# Patient Record
Sex: FEMALE | Race: WHITE | NOT HISPANIC OR LATINO | Employment: FULL TIME | URBAN - NONMETROPOLITAN AREA
[De-identification: names, ages, dates, MRNs, and addresses within clinical notes are randomized per-mention and may not be internally consistent; named-entity substitution may affect disease eponyms.]

---

## 2017-11-22 ENCOUNTER — LAB (OUTPATIENT)
Dept: LAB | Facility: HOSPITAL | Age: 42
End: 2017-11-22

## 2017-11-22 ENCOUNTER — TRANSCRIBE ORDERS (OUTPATIENT)
Dept: LAB | Facility: HOSPITAL | Age: 42
End: 2017-11-22

## 2017-11-22 DIAGNOSIS — R05.9 COUGH: ICD-10-CM

## 2017-11-22 DIAGNOSIS — R06.00 DYSPNEA, UNSPECIFIED TYPE: ICD-10-CM

## 2017-11-22 DIAGNOSIS — J30.1 ACUTE SEASONAL ALLERGIC RHINITIS DUE TO POLLEN: ICD-10-CM

## 2017-11-22 DIAGNOSIS — J30.89 PERENNIAL ALLERGIC RHINITIS: ICD-10-CM

## 2017-11-22 DIAGNOSIS — J45.40 MODERATE PERSISTENT ASTHMA, UNCOMPLICATED: Primary | ICD-10-CM

## 2017-11-22 DIAGNOSIS — J45.40 MODERATE PERSISTENT ASTHMA, UNCOMPLICATED: ICD-10-CM

## 2017-11-22 LAB
BASOPHILS # BLD AUTO: 0.05 10*3/MM3 (ref 0–0.3)
BASOPHILS NFR BLD AUTO: 0.6 % (ref 0–2)
DEPRECATED RDW RBC AUTO: 41.8 FL (ref 37–54)
EOSINOPHIL # BLD AUTO: 0.08 10*3/MM3 (ref 0–0.7)
EOSINOPHIL NFR BLD AUTO: 1 % (ref 0–5)
ERYTHROCYTE [DISTWIDTH] IN BLOOD BY AUTOMATED COUNT: 12.7 % (ref 11.5–14.5)
HCT VFR BLD AUTO: 42.4 % (ref 37–47)
HGB BLD-MCNC: 14.1 G/DL (ref 12–16)
IMM GRANULOCYTES # BLD: 0.02 10*3/MM3 (ref 0–0.03)
IMM GRANULOCYTES NFR BLD: 0.2 % (ref 0–0.5)
LYMPHOCYTES # BLD AUTO: 2.23 10*3/MM3 (ref 1–3)
LYMPHOCYTES NFR BLD AUTO: 27.5 % (ref 21–51)
MCH RBC QN AUTO: 30.7 PG (ref 27–33)
MCHC RBC AUTO-ENTMCNC: 33.3 G/DL (ref 33–37)
MCV RBC AUTO: 92.4 FL (ref 80–94)
MONOCYTES # BLD AUTO: 0.81 10*3/MM3 (ref 0.1–0.9)
MONOCYTES NFR BLD AUTO: 10 % (ref 0–10)
NEUTROPHILS # BLD AUTO: 4.91 10*3/MM3 (ref 1.4–6.5)
NEUTROPHILS NFR BLD AUTO: 60.7 % (ref 30–70)
PLATELET # BLD AUTO: 211 10*3/MM3 (ref 130–400)
PMV BLD AUTO: 11.9 FL (ref 6–10)
RBC # BLD AUTO: 4.59 10*6/MM3 (ref 4.2–5.4)
WBC NRBC COR # BLD: 8.1 10*3/MM3 (ref 4.5–12.5)

## 2017-11-22 PROCEDURE — 36415 COLL VENOUS BLD VENIPUNCTURE: CPT

## 2017-11-22 PROCEDURE — 85025 COMPLETE CBC W/AUTO DIFF WBC: CPT | Performed by: ALLERGY & IMMUNOLOGY

## 2017-11-22 PROCEDURE — 82785 ASSAY OF IGE: CPT | Performed by: ALLERGY & IMMUNOLOGY

## 2017-11-24 LAB — TOTAL IGE SMQN RAST: 7 IU/ML (ref 0–100)

## 2018-06-12 ENCOUNTER — TRANSCRIBE ORDERS (OUTPATIENT)
Dept: CARDIOLOGY | Facility: CLINIC | Age: 43
End: 2018-06-12

## 2018-06-12 DIAGNOSIS — I49.9 IRREGULAR HEART RATE: Primary | ICD-10-CM

## 2018-06-18 ENCOUNTER — LAB (OUTPATIENT)
Dept: LAB | Facility: HOSPITAL | Age: 43
End: 2018-06-18

## 2018-06-18 ENCOUNTER — TRANSCRIBE ORDERS (OUTPATIENT)
Dept: LAB | Facility: HOSPITAL | Age: 43
End: 2018-06-18

## 2018-06-18 DIAGNOSIS — J45.902: Primary | ICD-10-CM

## 2018-06-18 DIAGNOSIS — Z79.52: Primary | ICD-10-CM

## 2018-06-18 DIAGNOSIS — Z79.52: ICD-10-CM

## 2018-06-18 DIAGNOSIS — J45.902: ICD-10-CM

## 2018-06-18 PROCEDURE — 36415 COLL VENOUS BLD VENIPUNCTURE: CPT

## 2018-06-18 PROCEDURE — 82785 ASSAY OF IGE: CPT | Performed by: SPECIALIST

## 2018-06-18 PROCEDURE — 82784 ASSAY IGA/IGD/IGG/IGM EACH: CPT | Performed by: SPECIALIST

## 2018-06-18 PROCEDURE — 85048 AUTOMATED LEUKOCYTE COUNT: CPT | Performed by: SPECIALIST

## 2018-06-20 LAB
EOSINOPHIL # BLD AUTO: 0.1 X10E3/UL (ref 0–0.4)
IGA SERPL-MCNC: 244 MG/DL (ref 87–352)
IGG SERPL-MCNC: 756 MG/DL (ref 700–1600)
IGM SERPL-MCNC: 37 MG/DL (ref 26–217)

## 2018-06-21 ENCOUNTER — CONSULT (OUTPATIENT)
Dept: CARDIOLOGY | Facility: CLINIC | Age: 43
End: 2018-06-21

## 2018-06-21 VITALS
HEIGHT: 66 IN | SYSTOLIC BLOOD PRESSURE: 130 MMHG | DIASTOLIC BLOOD PRESSURE: 86 MMHG | HEART RATE: 68 BPM | WEIGHT: 237 LBS | BODY MASS INDEX: 38.09 KG/M2

## 2018-06-21 DIAGNOSIS — R01.1 MURMUR, CARDIAC: ICD-10-CM

## 2018-06-21 DIAGNOSIS — R06.02 SHORTNESS OF BREATH: ICD-10-CM

## 2018-06-21 DIAGNOSIS — E78.00 HYPERCHOLESTEREMIA: ICD-10-CM

## 2018-06-21 DIAGNOSIS — R00.2 PALPITATIONS: Primary | ICD-10-CM

## 2018-06-21 DIAGNOSIS — G47.30 SLEEP APNEA IN ADULT: ICD-10-CM

## 2018-06-21 DIAGNOSIS — E88.81 METABOLIC SYNDROME: ICD-10-CM

## 2018-06-21 DIAGNOSIS — I10 ESSENTIAL HYPERTENSION: ICD-10-CM

## 2018-06-21 DIAGNOSIS — E03.9 HYPOTHYROIDISM, UNSPECIFIED TYPE: ICD-10-CM

## 2018-06-21 PROBLEM — E88.810 METABOLIC SYNDROME: Status: ACTIVE | Noted: 2018-06-21

## 2018-06-21 LAB — TOTAL IGE SMQN RAST: 9 IU/ML (ref 0–100)

## 2018-06-21 PROCEDURE — 99204 OFFICE O/P NEW MOD 45 MIN: CPT | Performed by: INTERNAL MEDICINE

## 2018-06-21 PROCEDURE — 93000 ELECTROCARDIOGRAM COMPLETE: CPT | Performed by: INTERNAL MEDICINE

## 2018-06-21 RX ORDER — DILTIAZEM HYDROCHLORIDE 180 MG/1
180 CAPSULE, COATED, EXTENDED RELEASE ORAL DAILY
Qty: 30 CAPSULE | Refills: 6 | Status: SHIPPED | OUTPATIENT
Start: 2018-06-21 | End: 2018-07-11 | Stop reason: SDUPTHER

## 2018-06-21 RX ORDER — RANITIDINE 150 MG/1
150 TABLET ORAL 2 TIMES DAILY
COMMUNITY
End: 2019-04-11 | Stop reason: ALTCHOICE

## 2018-06-21 RX ORDER — LEVOTHYROXINE SODIUM 0.1 MG/1
100 TABLET ORAL DAILY
COMMUNITY
End: 2018-10-11 | Stop reason: DRUGHIGH

## 2018-06-21 RX ORDER — CETIRIZINE HYDROCHLORIDE 10 MG/1
10 TABLET ORAL DAILY
COMMUNITY
End: 2019-04-11 | Stop reason: ALTCHOICE

## 2018-06-21 RX ORDER — MONTELUKAST SODIUM 10 MG/1
10 TABLET ORAL NIGHTLY
COMMUNITY
End: 2019-04-11 | Stop reason: ALTCHOICE

## 2018-06-21 RX ORDER — ALBUTEROL SULFATE 90 UG/1
2 AEROSOL, METERED RESPIRATORY (INHALATION) EVERY 4 HOURS PRN
COMMUNITY

## 2018-06-21 RX ORDER — ROSUVASTATIN CALCIUM 20 MG/1
20 TABLET, COATED ORAL DAILY
COMMUNITY
End: 2018-10-11 | Stop reason: ALTCHOICE

## 2018-06-21 RX ORDER — FLUTICASONE PROPIONATE 50 MCG
2 SPRAY, SUSPENSION (ML) NASAL DAILY
COMMUNITY
End: 2019-04-11 | Stop reason: ALTCHOICE

## 2018-06-21 RX ORDER — ECHINACEA PURPUREA EXTRACT 125 MG
1 TABLET ORAL AS NEEDED
COMMUNITY
End: 2019-04-11 | Stop reason: ALTCHOICE

## 2018-06-21 RX ORDER — NADOLOL 20 MG/1
20 TABLET ORAL 2 TIMES DAILY
COMMUNITY
End: 2018-10-11 | Stop reason: DRUGHIGH

## 2018-06-21 RX ORDER — THIAMINE HCL 100 MG
TABLET ORAL DAILY
COMMUNITY
End: 2019-04-11 | Stop reason: ALTCHOICE

## 2018-06-21 RX ORDER — VITAMIN E 268 MG
400 CAPSULE ORAL DAILY
COMMUNITY
End: 2019-10-15 | Stop reason: ALTCHOICE

## 2018-06-21 NOTE — PROGRESS NOTES
CARDIAC COMPLAINTS  dyspnea and palpitations      Subjective   Tierney Gupta is a 43 y.o. female came in today for her initial cardiac evaluation.  She has history of hypothyroidism, history of palpitation, history of sleep apnea who also has significant metabolic syndrome.  She was seen about 4 years ago for palpitation.  Workup at that time showed normal LV systolic function, mild pulmonary hypertension and sinus tachycardia.  She was not able to tolerate Toprol and so Corgard was started.  She tolerated it well for the last few years.  She is now referred back because she's been having increasing palpitation and noticed that the Corgard is not helping as it is done before.  It occurs mostly on exertion but occasionally at rest.  She feels the heart is racing and is not associated with any dizziness or loss of consciousness.  It is gradual in onset.  She also has been having increasing shortness of breath and she's not sure whether this is due to asthma or due to her cardiac status.  The shortness of breath occurs mostly on exertion.  She used to be a smoker who completely quit smoking few years ago.  She apparently had lab work done at your office recently and was told the thyroid dosage is appropriate.  She also went on a diet and lost about 40 pounds in the last 4 years.  She denies having any chest pain.  She denies having any dizziness or loss of consciousness.  She does have sleep apnea and uses a CPAP machine.    Past Surgical History:   Procedure Laterality Date   • CARDIOVASCULAR STRESS TEST  08/28/2008    R. Stress- 6 Min, 30 Secs. 84% THR. Negative   • CONVERTED (HISTORICAL) HOLTER  07/03/2008    AVG HR 82 BPM. Max-140 BPM.   • ECHO - CONVERTED  06/30/2008    EF >60%. RVSP 33 mmHg.       Current Outpatient Prescriptions   Medication Sig Dispense Refill   • albuterol (PROVENTIL HFA;VENTOLIN HFA) 108 (90 Base) MCG/ACT inhaler Inhale 2 puffs Every 4 (Four) Hours As Needed for Wheezing.     • ALLERGY SERUM  INJECTION Inject  under the skin 1 (One) Time.     • cetirizine (zyrTEC) 10 MG tablet Take 10 mg by mouth Daily.     • Cholecalciferol (VITAMIN D3) 5000 units capsule capsule Take 5,000 Units by mouth Daily.     • fluticasone (FLONASE) 50 MCG/ACT nasal spray 2 sprays into each nostril Daily.     • Fluticasone Furoate-Vilanterol (BREO ELLIPTA) 200-25 MCG/INH aerosol powder  Inhale.     • levothyroxine (SYNTHROID, LEVOTHROID) 100 MCG tablet Take 100 mcg by mouth Daily.     • montelukast (SINGULAIR) 10 MG tablet Take 10 mg by mouth Every Night.     • nadolol (CORGARD) 20 MG tablet Take 20 mg by mouth 2 (Two) Times a Day.     • raNITIdine (ZANTAC) 150 MG tablet Take 150 mg by mouth 2 (Two) Times a Day.     • rosuvastatin (CRESTOR) 20 MG tablet Take 20 mg by mouth Daily.     • sodium chloride (OCEAN) 0.65 % nasal spray 1 spray into each nostril As Needed for Congestion.     • Umeclidinium Bromide (INCRUSE ELLIPTA) 62.5 MCG/INH aerosol powder  Inhale.     • vitamin B-12 (CYANOCOBALAMIN) 2500 MCG sublingual tablet tablet Place  under the tongue Daily.     • vitamin E 400 UNIT capsule Take 400 Units by mouth Daily.     • diltiaZEM CD (CARDIZEM CD) 180 MG 24 hr capsule Take 1 capsule by mouth Daily. 30 capsule 6     No current facility-administered medications for this visit.            ALLERGIES:  Diphenhydramine and Aspirin    Past Medical History:   Diagnosis Date   • Dilatation of esophagus    • H/O hernia repair    • H/O total hysterectomy    • H/O:      x2   • History of carpal tunnel release    • History of cholecystectomy    • Hyperlipidemia    • Hypothyroidism    • Palpitations    • Sleep apnea     uses CPAP   • Tachycardia        History   Smoking Status   • Former Smoker   Smokeless Tobacco   • Never Used     Comment: recently quit smoking, currently wearing patch           Family History   Problem Relation Age of Onset   • Heart disease Mother    • Hypertension Mother    • Hyperlipidemia Mother    •  "Heart attack Father    • No Known Problems Brother        Review of Systems   Constitution: Positive for weakness and malaise/fatigue. Negative for decreased appetite.   HENT: Negative for congestion and sore throat.    Eyes: Negative for blurred vision.   Cardiovascular: Positive for dyspnea on exertion and palpitations. Negative for chest pain.   Respiratory: Positive for shortness of breath and snoring.    Endocrine: Negative for cold intolerance and heat intolerance.   Hematologic/Lymphatic: Negative for adenopathy. Does not bruise/bleed easily.   Skin: Negative for itching, nail changes and skin cancer.   Musculoskeletal: Negative for arthritis and myalgias.   Gastrointestinal: Negative for abdominal pain, dysphagia and heartburn.   Genitourinary: Negative for bladder incontinence and frequency.   Neurological: Negative for dizziness, light-headedness, seizures and vertigo.   Psychiatric/Behavioral: Negative for altered mental status.   Allergic/Immunologic: Negative for environmental allergies and hives.       Diabetes- No  Thyroid- abnormal    Objective     /86 (BP Location: Right arm)   Pulse 68   Ht 167.6 cm (66\")   Wt 108 kg (237 lb)   BMI 38.25 kg/m²     Physical Exam   Constitutional: She is oriented to person, place, and time. She appears well-developed and well-nourished.   HENT:   Head: Normocephalic.   Nose: Nose normal.   Eyes: EOM are normal. Pupils are equal, round, and reactive to light.   Neck: Normal range of motion. Neck supple.   Cardiovascular: Normal rate, regular rhythm, S1 normal and S2 normal.    Murmur heard.  Pulmonary/Chest: Effort normal and breath sounds normal.   Abdominal: Soft. Bowel sounds are normal.   Musculoskeletal: Normal range of motion. She exhibits no edema.   Neurological: She is alert and oriented to person, place, and time.   Skin: Skin is warm and dry.   Psychiatric: She has a normal mood and affect.         ECG 12 Lead  Date/Time: 6/21/2018 12:04 " PM  Performed by: ALDA MULLIGAN  Authorized by: ALDA MULLIGAN   Comparison: compared with previous ECG from 6/12/2018  Similar to previous ECG  Rhythm: sinus rhythm  Rate: normal  QRS axis: normal  Clinical impression: normal ECG              Assessment/Plan   Patient's Body mass index is 38.25 kg/m². BMI is above normal parameters. Recommendations include: educational material, exercise counseling and nutrition counseling.     Tierney was seen today for establish care and asa.    Diagnoses and all orders for this visit:    Palpitations  -     Holter Monitor - 72 Hour Up To 21 Days; Future  -     diltiaZEM CD (CARDIZEM CD) 180 MG 24 hr capsule; Take 1 capsule by mouth Daily.    Shortness of breath  -     Adult Transthoracic Echo Complete W/ Cont if Necessary Per Protocol; Future    Essential hypertension  -     diltiaZEM CD (CARDIZEM CD) 180 MG 24 hr capsule; Take 1 capsule by mouth Daily.    Hypercholesteremia    Hypothyroidism, unspecified type    Metabolic syndrome    Murmur, cardiac    Sleep apnea in adult     At baseline, her heart rate is stable.  Her blood pressure is upper limit of normal.  Her BMI is still around 38.  Her clinical examination reveals slightly loud second heart sound and short systolic murmur at the mitral area.  Her EKG showed normal sinus rhythm, normal KS interval and normal QTC.  At this time, I advised her to taper and stop the Corgard.  I started her on Cardizem CD at 180 mg once a day to be started after stopping the Corgard.  I also going to place a Holter monitor after she is on Corgard.  She also need an echocardiogram to evaluate the LV function and the PA pressure.  If the PA pressure is gone up, she needs to see her pulmonologist regarding the CPAP machine.  I also had a long talk with the about the diet, exercise and weight reduction.  Based on the results of the echo and the Holter, further recommendations will be made.               Electronically signed by Alda  MD Karson June 21, 2018 11:55 AM

## 2018-06-21 NOTE — PATIENT INSTRUCTIONS
"DASH Eating Plan  DASH stands for \"Dietary Approaches to Stop Hypertension.\" The DASH eating plan is a healthy eating plan that has been shown to reduce high blood pressure (hypertension). It may also reduce your risk for type 2 diabetes, heart disease, and stroke. The DASH eating plan may also help with weight loss.  What are tips for following this plan?  General guidelines  · Avoid eating more than 2,300 mg (milligrams) of salt (sodium) a day. If you have hypertension, you may need to reduce your sodium intake to 1,500 mg a day.  · Limit alcohol intake to no more than 1 drink a day for nonpregnant women and 2 drinks a day for men. One drink equals 12 oz of beer, 5 oz of wine, or 1½ oz of hard liquor.  · Work with your health care provider to maintain a healthy body weight or to lose weight. Ask what an ideal weight is for you.  · Get at least 30 minutes of exercise that causes your heart to beat faster (aerobic exercise) most days of the week. Activities may include walking, swimming, or biking.  · Work with your health care provider or diet and nutrition specialist (dietitian) to adjust your eating plan to your individual calorie needs.  Reading food labels  · Check food labels for the amount of sodium per serving. Choose foods with less than 5 percent of the Daily Value of sodium. Generally, foods with less than 300 mg of sodium per serving fit into this eating plan.  · To find whole grains, look for the word \"whole\" as the first word in the ingredient list.  Shopping  · Buy products labeled as \"low-sodium\" or \"no salt added.\"  · Buy fresh foods. Avoid canned foods and premade or frozen meals.  Cooking  · Avoid adding salt when cooking. Use salt-free seasonings or herbs instead of table salt or sea salt. Check with your health care provider or pharmacist before using salt substitutes.  · Do not rico foods. Cook foods using healthy methods such as baking, boiling, grilling, and broiling instead.  · Cook with " heart-healthy oils, such as olive, canola, soybean, or sunflower oil.  Meal planning    · Eat a balanced diet that includes:  ? 5 or more servings of fruits and vegetables each day. At each meal, try to fill half of your plate with fruits and vegetables.  ? Up to 6-8 servings of whole grains each day.  ? Less than 6 oz of lean meat, poultry, or fish each day. A 3-oz serving of meat is about the same size as a deck of cards. One egg equals 1 oz.  ? 2 servings of low-fat dairy each day.  ? A serving of nuts, seeds, or beans 5 times each week.  ? Heart-healthy fats. Healthy fats called Omega-3 fatty acids are found in foods such as flaxseeds and coldwater fish, like sardines, salmon, and mackerel.  · Limit how much you eat of the following:  ? Canned or prepackaged foods.  ? Food that is high in trans fat, such as fried foods.  ? Food that is high in saturated fat, such as fatty meat.  ? Sweets, desserts, sugary drinks, and other foods with added sugar.  ? Full-fat dairy products.  · Do not salt foods before eating.  · Try to eat at least 2 vegetarian meals each week.  · Eat more home-cooked food and less restaurant, buffet, and fast food.  · When eating at a restaurant, ask that your food be prepared with less salt or no salt, if possible.  What foods are recommended?  The items listed may not be a complete list. Talk with your dietitian about what dietary choices are best for you.  Grains  Whole-grain or whole-wheat bread. Whole-grain or whole-wheat pasta. Brown rice. Oatmeal. Quinoa. Bulgur. Whole-grain and low-sodium cereals. [place] bread. Low-fat, low-sodium crackers. Whole-wheat flour tortillas.  Vegetables  Fresh or frozen vegetables (raw, steamed, roasted, or grilled). Low-sodium or reduced-sodium tomato and vegetable juice. Low-sodium or reduced-sodium tomato sauce and tomato paste. Low-sodium or reduced-sodium canned vegetables.  Fruits  All fresh, dried, or frozen fruit. Canned fruit in natural juice (without  added sugar).  Meat and other protein foods  Skinless chicken or turkey. Ground chicken or turkey. Pork with fat trimmed off. Fish and seafood. Egg whites. Dried beans, peas, or lentils. Unsalted nuts, nut butters, and seeds. Unsalted canned beans. Lean cuts of beef with fat trimmed off. Low-sodium, lean deli meat.  Dairy  Low-fat (1%) or fat-free (skim) milk. Fat-free, low-fat, or reduced-fat cheeses. Nonfat, low-sodium ricotta or cottage cheese. Low-fat or nonfat yogurt. Low-fat, low-sodium cheese.  Fats and oils  Soft margarine without trans fats. Vegetable oil. Low-fat, reduced-fat, or light mayonnaise and salad dressings (reduced-sodium). Canola, safflower, olive, soybean, and sunflower oils. Avocado.  Seasoning and other foods  Herbs. Spices. Seasoning mixes without salt. Unsalted popcorn and pretzels. Fat-free sweets.  What foods are not recommended?  The items listed may not be a complete list. Talk with your dietitian about what dietary choices are best for you.  Grains  Baked goods made with fat, such as croissants, muffins, or some breads. Dry pasta or rice meal packs.  Vegetables  Creamed or fried vegetables. Vegetables in a cheese sauce. Regular canned vegetables (not low-sodium or reduced-sodium). Regular canned tomato sauce and paste (not low-sodium or reduced-sodium). Regular tomato and vegetable juice (not low-sodium or reduced-sodium). Pickles. Olives.  Fruits  Canned fruit in a light or heavy syrup. Fried fruit. Fruit in cream or butter sauce.  Meat and other protein foods  Fatty cuts of meat. Ribs. Fried meat. Mcdonough. Sausage. Bologna and other processed lunch meats. Salami. Fatback. Hotdogs. Bratwurst. Salted nuts and seeds. Canned beans with added salt. Canned or smoked fish. Whole eggs or egg yolks. Chicken or turkey with skin.  Dairy  Whole or 2% milk, cream, and half-and-half. Whole or full-fat cream cheese. Whole-fat or sweetened yogurt. Full-fat cheese. Nondairy creamers. Whipped toppings.  Processed cheese and cheese spreads.  Fats and oils  Butter. Stick margarine. Lard. Shortening. Ghee. Mcdonough fat. Tropical oils, such as coconut, palm kernel, or palm oil.  Seasoning and other foods  Salted popcorn and pretzels. Onion salt, garlic salt, seasoned salt, table salt, and sea salt. Worcestershire sauce. Tartar sauce. Barbecue sauce. Teriyaki sauce. Soy sauce, including reduced-sodium. Steak sauce. Canned and packaged gravies. Fish sauce. Oyster sauce. Cocktail sauce. Horseradish that you find on the shelf. Ketchup. Mustard. Meat flavorings and tenderizers. Bouillon cubes. Hot sauce and Tabasco sauce. Premade or packaged marinades. Premade or packaged taco seasonings. Relishes. Regular salad dressings.  Where to find more information:  · National Heart, Lung, and Blood [place]: www.nhlbi.nih.gov  · American Heart Association: www.heart.org  Summary  · The DASH eating plan is a healthy eating plan that has been shown to reduce high blood pressure (hypertension). It may also reduce your risk for type 2 diabetes, heart disease, and stroke.  · With the DASH eating plan, you should limit salt (sodium) intake to 2,300 mg a day. If you have hypertension, you may need to reduce your sodium intake to 1,500 mg a day.  · When on the DASH eating plan, aim to eat more fresh fruits and vegetables, whole grains, lean proteins, low-fat dairy, and heart-healthy fats.  · Work with your health care provider or diet and nutrition specialist (dietitian) to adjust your eating plan to your individual calorie needs.  This information is not intended to replace advice given to you by your health care provider. Make sure you discuss any questions you have with your health care provider.  Document Released: 12/06/2012 Document Revised: 12/11/2017 Document Reviewed: 12/11/2017  [employer] Interactive Patient Education © 2018 [employer]

## 2018-06-28 ENCOUNTER — OUTSIDE FACILITY SERVICE (OUTPATIENT)
Dept: CARDIOLOGY | Facility: CLINIC | Age: 43
End: 2018-06-28

## 2018-06-28 ENCOUNTER — CLINICAL SUPPORT (OUTPATIENT)
Dept: CARDIOLOGY | Facility: CLINIC | Age: 43
End: 2018-06-28

## 2018-06-28 ENCOUNTER — HOSPITAL ENCOUNTER (OUTPATIENT)
Dept: CARDIOLOGY | Facility: HOSPITAL | Age: 43
Discharge: HOME | End: 2018-06-28
Attending: INTERNAL MEDICINE | Admitting: INTERNAL MEDICINE

## 2018-06-28 DIAGNOSIS — R00.2 PALPITATIONS: ICD-10-CM

## 2018-06-28 DIAGNOSIS — R06.02 SHORTNESS OF BREATH: ICD-10-CM

## 2018-06-28 LAB
MAXIMAL PREDICTED HEART RATE: 175 BPM
STRESS TARGET HR: 149 BPM

## 2018-06-28 PROCEDURE — 93306 TTE W/DOPPLER COMPLETE: CPT | Performed by: INTERNAL MEDICINE

## 2018-06-28 PROCEDURE — 93306 TTE W/DOPPLER COMPLETE: CPT

## 2018-06-28 PROCEDURE — 0296T PR EXT ECG > 48HR TO 21 DAY RCRD W/CONECT INTL RCRD: CPT | Performed by: INTERNAL MEDICINE

## 2018-07-10 ENCOUNTER — OUTSIDE FACILITY SERVICE (OUTPATIENT)
Dept: CARDIOLOGY | Facility: CLINIC | Age: 43
End: 2018-07-10

## 2018-07-10 PROCEDURE — 0298T PR EXT ECG > 48HR TO 21 DAY REVIEW AND INTERPRETATN: CPT | Performed by: INTERNAL MEDICINE

## 2018-07-11 ENCOUNTER — TELEPHONE (OUTPATIENT)
Dept: CARDIOLOGY | Facility: CLINIC | Age: 43
End: 2018-07-11

## 2018-07-11 DIAGNOSIS — I10 ESSENTIAL HYPERTENSION: ICD-10-CM

## 2018-07-11 DIAGNOSIS — R00.2 PALPITATIONS: ICD-10-CM

## 2018-07-11 RX ORDER — DILTIAZEM HYDROCHLORIDE 180 MG/1
180 CAPSULE, COATED, EXTENDED RELEASE ORAL 2 TIMES DAILY
Qty: 60 CAPSULE | Refills: 6 | Status: SHIPPED | OUTPATIENT
Start: 2018-07-11 | End: 2018-10-11 | Stop reason: ALTCHOICE

## 2018-07-11 NOTE — TELEPHONE ENCOUNTER
----- Message from Alda Ty MD sent at 7/11/2018 10:44 AM EDT -----  Make it BID  ----- Message -----  From: Jeanne Shine RN  Sent: 7/11/2018   9:46 AM  To: Alda Ty MD    Pt aware of results. States she was still taking the nadolol 20 mg BID while wearing the monitor, didn't really have many symptoms.  After switching to the Cardizem  mg daily she is having a lot of palpitations by late afternoon. Feels like the medication runs out about 7-8 PM. BP goes up also, 150/100 the other night. Pulse running in the 80's.

## 2018-08-20 ENCOUNTER — TELEPHONE (OUTPATIENT)
Dept: CARDIOLOGY | Facility: CLINIC | Age: 43
End: 2018-08-20

## 2018-08-20 NOTE — TELEPHONE ENCOUNTER
Pt LM to return call.       Pt states she thinks she had an allergic reaction to one of her medications. Had been having some mild edema in her lower extremities but nothing significant. Friday she started having swelling in her hands, itching with red raised rash on her face, top of her arms. She felt like it was related to either the Crestor or Cartia, both of which she started several months ago.  Also said she had an allergy shot Thursday. Advised her to see [facility] today so she can better evaluate. States she has already called them this morning for an appointment. Will call us back with report after seeing her or have PCP call us for an appointment if she feels necessary.

## 2018-10-11 ENCOUNTER — OFFICE VISIT (OUTPATIENT)
Dept: CARDIOLOGY | Facility: CLINIC | Age: 43
End: 2018-10-11

## 2018-10-11 VITALS
BODY MASS INDEX: 39.53 KG/M2 | HEART RATE: 72 BPM | WEIGHT: 246 LBS | SYSTOLIC BLOOD PRESSURE: 128 MMHG | DIASTOLIC BLOOD PRESSURE: 80 MMHG | HEIGHT: 66 IN

## 2018-10-11 DIAGNOSIS — E03.9 ACQUIRED HYPOTHYROIDISM: ICD-10-CM

## 2018-10-11 DIAGNOSIS — R01.1 MURMUR, CARDIAC: ICD-10-CM

## 2018-10-11 DIAGNOSIS — I10 ESSENTIAL HYPERTENSION: Primary | ICD-10-CM

## 2018-10-11 DIAGNOSIS — E66.9 OBESITY (BMI 30-39.9): ICD-10-CM

## 2018-10-11 DIAGNOSIS — R00.2 PALPITATIONS: ICD-10-CM

## 2018-10-11 DIAGNOSIS — R06.02 SHORTNESS OF BREATH: ICD-10-CM

## 2018-10-11 DIAGNOSIS — G47.30 SLEEP APNEA IN ADULT: ICD-10-CM

## 2018-10-11 DIAGNOSIS — E78.00 HYPERCHOLESTEREMIA: ICD-10-CM

## 2018-10-11 PROCEDURE — 99213 OFFICE O/P EST LOW 20 MIN: CPT | Performed by: NURSE PRACTITIONER

## 2018-10-11 RX ORDER — NADOLOL 40 MG/1
40 TABLET ORAL 2 TIMES DAILY
COMMUNITY
End: 2019-04-11 | Stop reason: ALTCHOICE

## 2018-10-11 RX ORDER — SERTRALINE HYDROCHLORIDE 100 MG/1
100 TABLET, FILM COATED ORAL DAILY
COMMUNITY

## 2018-10-11 RX ORDER — LEVOTHYROXINE SODIUM 112 UG/1
112 TABLET ORAL DAILY
COMMUNITY
End: 2019-04-11 | Stop reason: ALTCHOICE

## 2018-10-11 NOTE — PROGRESS NOTES
Chief Complaint   Patient presents with   • Follow-up     For cardiac management. Patient is not on aspirin. Last lab work was done a couple of week ago per PCP, not in chart. Is no longer taking cardizem. Reports that she does occasionally have shortness of breath if humidity is high. Nadolol is now 40 mg BID.    • Med Refill     PCP does medication refills.        Cardiac Complaints  none      Subjective   Tierney Gupta is a 43 y.o. female with hypothyroidism, palpitations, sleep apnea, and significant metabolic syndrome.  She was seen about 4 years ago for palpitation.  Workup at that time showed normal LV systolic function, mild pulmonary hypertension and sinus tachycardia.  She was not able to tolerate Toprol and so Corgard was started.  She tolerated it well for the last few years.  She was then referred back because she had increasing palpitations and noticed that Corgard was not helping as it is done before. She had been having increasing shortness of breath. Holter and echo were advised.  Holter showed baseline NSR with highest heart rate at 151.  Echo showed normal LV function and no valvular concerns. She then felt like cardizem was causing side effects, allergy like symptoms and it was stopped.  She comes today for follow up and denies any cardiac concerns.  She reports being back on nadolol since she was here last and has tolerated well, no palpitations reported. She is not wearing her CPAP for sleep apnea but will follow with pulmonary in regards. She also reports she has quit her allergy shots as they were causing her to swell. Shortness of breath persists with humidity, but otherwise she does well.  Labs are done PCP, most recently a few weeks ago.  No current available for review, but she does admit synthroid was increased after TSH was high.  No refills needed as they are done with PCP as well.            Cardiac History  Past Surgical History:   Procedure Laterality Date   • CARDIOVASCULAR STRESS TEST   2008    R. Stress- 6 Min, 30 Secs. 84% THR. Negative   • CONVERTED (HISTORICAL) HOLTER  2008    AVG HR 82 BPM. Max-140 BPM.   • CONVERTED (HISTORICAL) HOLTER  07/10/2018    Baseline NSR with some sinus tach high 151, nadolol d/c and cardizem started   • ECHO - CONVERTED  2008    EF >60%. RVSP 33 mmHg.   • ECHO - CONVERTED  2018    EF 65%. RVSP- 16 mmHg.   • ECHO - CONVERTED  2018    EF 60-65%, mild MR, no AS, diastolic dysfunction, RSVP 16mmHg       Current Outpatient Prescriptions   Medication Sig Dispense Refill   • albuterol (PROVENTIL HFA;VENTOLIN HFA) 108 (90 Base) MCG/ACT inhaler Inhale 2 puffs Every 4 (Four) Hours As Needed for Wheezing.     • cetirizine (zyrTEC) 10 MG tablet Take 10 mg by mouth Daily.     • fluticasone (FLONASE) 50 MCG/ACT nasal spray 2 sprays into each nostril Daily.     • levothyroxine (SYNTHROID, LEVOTHROID) 112 MCG tablet Take 112 mcg by mouth Daily.     • montelukast (SINGULAIR) 10 MG tablet Take 10 mg by mouth Every Night.     • nadolol (CORGARD) 40 MG tablet Take 40 mg by mouth 2 (Two) Times a Day.     • raNITIdine (ZANTAC) 150 MG tablet Take 150 mg by mouth 2 (Two) Times a Day.     • sertraline (ZOLOFT) 100 MG tablet Take 50 mg by mouth Daily.     • sodium chloride (OCEAN) 0.65 % nasal spray 1 spray into each nostril As Needed for Congestion.     • vitamin B-12 (CYANOCOBALAMIN) 2500 MCG sublingual tablet tablet Place  under the tongue Daily.     • vitamin E 400 UNIT capsule Take 400 Units by mouth Daily.       No current facility-administered medications for this visit.        Diphenhydramine and Aspirin    Past Medical History:   Diagnosis Date   • Dilatation of esophagus    • H/O hernia repair    • H/O total hysterectomy    • H/O:      x2   • History of carpal tunnel release    • History of cholecystectomy    • Hyperlipidemia    • Hypothyroidism    • Palpitations    • Sleep apnea     uses CPAP   • Tachycardia        Social History     Social  "History   • Marital status: Single     Spouse name: N/A   • Number of children: N/A   • Years of education: N/A     Occupational History   • Not on file.     Social History Main Topics   • Smoking status: Former Smoker   • Smokeless tobacco: Never Used      Comment: recently quit smoking, currently wearing patch    • Alcohol use No   • Drug use: No   • Sexual activity: Not on file     Other Topics Concern   • Not on file     Social History Narrative   • No narrative on file       Family History   Problem Relation Age of Onset   • Heart disease Mother    • Hypertension Mother    • Hyperlipidemia Mother    • Heart attack Father    • No Known Problems Brother        Review of Systems   Constitution: Negative for weakness and malaise/fatigue.   Cardiovascular: Negative for chest pain, dyspnea on exertion, irregular heartbeat, leg swelling, orthopnea, palpitations and syncope.   Respiratory: Positive for shortness of breath. Negative for cough and wheezing.    Musculoskeletal: Negative for back pain, joint pain and joint swelling.   Gastrointestinal: Negative for anorexia, heartburn, nausea and vomiting.   Genitourinary: Negative for dysuria, hematuria, hesitancy and nocturia.   Neurological: Negative for dizziness, light-headedness and loss of balance.   Psychiatric/Behavioral: Negative for depression and memory loss. The patient is not nervous/anxious.            Objective     /80 (BP Location: Right arm)   Pulse 72   Ht 167.6 cm (65.98\")   Wt 112 kg (246 lb)   BMI 39.72 kg/m²     Physical Exam   Constitutional: She is oriented to person, place, and time. She appears well-developed and well-nourished.   HENT:   Head: Normocephalic and atraumatic.   Eyes: Pupils are equal, round, and reactive to light. EOM are normal.   Neck: Normal range of motion. Neck supple.   Cardiovascular: Normal rate and regular rhythm.    Murmur heard.  Pulmonary/Chest: Effort normal and breath sounds normal.   Abdominal: Soft. "   Musculoskeletal: Normal range of motion.   Neurological: She is alert and oriented to person, place, and time.   Skin: Skin is warm and dry.   Psychiatric: She has a normal mood and affect. Her behavior is normal.       Procedures    Assessment/Plan     HR is stable today at 72.  Since discontinuing the cardizem and going back on nadolol she states symptoms of palpitations have been very well controlled and symptoms are denied, she will continue current.  BP is stable today with HTN well controlled, current regimen advised. Most recent echo and holter findings reviewed.  For hypothyroidism, she does state TSH was recently elevated with dose of synthroid having to be increased.  Could we have most recent labs for review?  After discussion, she did admit to not using her CPAP routinely as she feels it causes a great of sinus issues, she will discuss different mask options with  at next visit.   BMI elevated today at 39.72 and weight up about 9 pounds from prior.  Good cardiac diet with limited caloric intake, saturated fat, and carbohydrates recommended as well as increased walking regimen.  6 month follow up scheduled or sooner if needed.        Problems Addressed this Visit        Cardiovascular and Mediastinum    Palpitations    Murmur, cardiac    Hypercholesteremia    Essential hypertension - Primary    Relevant Medications    nadolol (CORGARD) 40 MG tablet       Respiratory    Sleep apnea in adult    Shortness of breath       Endocrine    Hypothyroidism    Relevant Medications    levothyroxine (SYNTHROID, LEVOTHROID) 112 MCG tablet    nadolol (CORGARD) 40 MG tablet      Other Visit Diagnoses     Obesity (BMI 30-39.9)              Patient's Body mass index is 39.72 kg/m². BMI is above normal parameters. Recommendations include: nutrition counseling.            Electronically signed by DANNY Lara October 12, 2018 10:10 AM

## 2019-03-01 DIAGNOSIS — I10 ESSENTIAL HYPERTENSION: ICD-10-CM

## 2019-03-01 DIAGNOSIS — R00.2 PALPITATIONS: ICD-10-CM

## 2019-03-01 RX ORDER — DILTIAZEM HYDROCHLORIDE 180 MG/1
CAPSULE, EXTENDED RELEASE ORAL
Qty: 60 CAPSULE | Refills: 5 | OUTPATIENT
Start: 2019-03-01

## 2019-04-11 ENCOUNTER — OFFICE VISIT (OUTPATIENT)
Dept: CARDIOLOGY | Facility: CLINIC | Age: 44
End: 2019-04-11

## 2019-04-11 VITALS
HEART RATE: 68 BPM | BODY MASS INDEX: 40.02 KG/M2 | HEIGHT: 66 IN | SYSTOLIC BLOOD PRESSURE: 112 MMHG | WEIGHT: 249 LBS | DIASTOLIC BLOOD PRESSURE: 76 MMHG

## 2019-04-11 DIAGNOSIS — F41.9 ANXIETY: ICD-10-CM

## 2019-04-11 DIAGNOSIS — Z91.09 ENVIRONMENTAL ALLERGIES: ICD-10-CM

## 2019-04-11 DIAGNOSIS — E03.9 ACQUIRED HYPOTHYROIDISM: ICD-10-CM

## 2019-04-11 DIAGNOSIS — I10 ESSENTIAL HYPERTENSION: Primary | ICD-10-CM

## 2019-04-11 DIAGNOSIS — E78.00 HYPERCHOLESTEREMIA: ICD-10-CM

## 2019-04-11 DIAGNOSIS — G47.30 SLEEP APNEA IN ADULT: ICD-10-CM

## 2019-04-11 DIAGNOSIS — E66.01 MORBID OBESITY WITH BMI OF 40.0-44.9, ADULT (HCC): ICD-10-CM

## 2019-04-11 DIAGNOSIS — R00.2 PALPITATIONS: ICD-10-CM

## 2019-04-11 PROCEDURE — 99213 OFFICE O/P EST LOW 20 MIN: CPT | Performed by: NURSE PRACTITIONER

## 2019-04-11 RX ORDER — LEVOTHYROXINE SODIUM 0.1 MG/1
100 TABLET ORAL DAILY
COMMUNITY
End: 2019-10-15 | Stop reason: ALTCHOICE

## 2019-04-11 RX ORDER — NADOLOL 20 MG/1
TABLET ORAL
Qty: 270 TABLET | Refills: 2 | Status: SHIPPED | OUTPATIENT
Start: 2019-04-11 | End: 2019-10-15 | Stop reason: SDUPTHER

## 2019-04-11 RX ORDER — NADOLOL 20 MG/1
20 TABLET ORAL 3 TIMES DAILY
COMMUNITY
End: 2019-04-11 | Stop reason: SDUPTHER

## 2019-04-11 NOTE — PROGRESS NOTES
Chief Complaint   Patient presents with   • Follow-up     For cardiac management. Patient is not on aspirin. Last lab work was done about 3 months ago per PCP, not in chart. PCP is now Yolis Wong. States that since she was here last she had a severe allergic reaction and they don't know what caused. Reports that she has had some palpitations, states that they make her anxious, but they usually come on when it is time for her to take night dose of medication.    • Med Refill     Would like refill on nadolol. 90 day supplies to [facility] in [place].        Cardiac Complaints  palpitations      Subjective   Tierney Gupta is a 43 y.o. female with hypothyroidism, HTN, palpitations, sleep apnea, and significant metabolic syndrome.  She was seen about 4 years ago for palpitation.  Workup at that time showed normal LV systolic function, mild pulmonary hypertension and sinus tachycardia.  She was not able to tolerate Toprol and so Corgard was started.  She tolerated it well for the last few years.  She was then referred back because she had increasing palpitations and noticed that Corgard was not helping as it is done before. She had been having increasing shortness of breath. Holter and echo were advised.  Holter showed baseline NSR with highest heart rate at 151.  Echo showed normal LV function and no valvular concerns. She then felt like cardizem was causing side effects, allergy like symptoms and it was stopped. At last visit, she reported since being switched back to nadolol palpitations had been much better controlled.  She returns today for follow up and denies any new concerns.  She does have some palpitations if she does not take her night time dosing of her beta blocker.  She reports when she remembers and takes it, they go away.  She does report she gets anxious when it happens but then when the palpitations gets better, the nervousness subsides.  She does report a large allergic reaction several months back  where she broke out in hives and rash and reports it slowly started going away by end of February.  She says this happened multiple times over many months.  She states that she was very stressed at the time and they could not find a cause of the reaction.  Labs she admits are followed by PCP and were done last 3 months ago, no copy available.  She is now following with Yolis Wong for PCP.  Refills of nadolol requested.        Cardiac History  Past Surgical History:   Procedure Laterality Date   • CARDIOVASCULAR STRESS TEST  2008    R. Stress- 6 Min, 30 Secs. 84% THR. Negative   • CONVERTED (HISTORICAL) HOLTER  2008    AVG HR 82 BPM. Max-140 BPM.   • CONVERTED (HISTORICAL) HOLTER  07/10/2018    Baseline NSR with some sinus tach high 151, nadolol d/c and cardizem started   • ECHO - CONVERTED  2008    EF >60%. RVSP 33 mmHg.   • ECHO - CONVERTED  2018    EF 65%. RVSP- 16 mmHg.   • ECHO - CONVERTED  2018    EF 60-65%, mild MR, no AS, diastolic dysfunction, RSVP 16mmHg       Current Outpatient Medications   Medication Sig Dispense Refill   • albuterol (PROVENTIL HFA;VENTOLIN HFA) 108 (90 Base) MCG/ACT inhaler Inhale 2 puffs Every 4 (Four) Hours As Needed for Wheezing.     • levothyroxine (SYNTHROID, LEVOTHROID) 100 MCG tablet Take 100 mcg by mouth Daily.     • nadolol (CORGARD) 20 MG tablet Three times daily 270 tablet 2   • sertraline (ZOLOFT) 100 MG tablet Take 50 mg by mouth Daily.     • vitamin E 400 UNIT capsule Take 400 Units by mouth Daily.       No current facility-administered medications for this visit.        Diphenhydramine and Aspirin    Past Medical History:   Diagnosis Date   • Dilatation of esophagus    • H/O hernia repair    • H/O total hysterectomy    • H/O:      x2   • History of carpal tunnel release    • History of cholecystectomy    • Hyperlipidemia    • Hypothyroidism    • Palpitations    • Sleep apnea     uses CPAP   • Tachycardia        Social History  "    Socioeconomic History   • Marital status: Single     Spouse name: Not on file   • Number of children: Not on file   • Years of education: Not on file   • Highest education level: Not on file   Tobacco Use   • Smoking status: Former Smoker   • Smokeless tobacco: Never Used   • Tobacco comment: recently quit smoking, currently wearing patch    Substance and Sexual Activity   • Alcohol use: No   • Drug use: No       Family History   Problem Relation Age of Onset   • Heart disease Mother    • Hypertension Mother    • Hyperlipidemia Mother    • Heart attack Father    • No Known Problems Brother        Review of Systems   Constitution: Negative for weakness and malaise/fatigue.   Cardiovascular: Positive for palpitations. Negative for chest pain, claudication, dyspnea on exertion, irregular heartbeat, leg swelling, near-syncope, orthopnea and syncope.   Respiratory: Negative for cough, shortness of breath and wheezing.    Musculoskeletal: Negative for back pain, joint pain and joint swelling.   Gastrointestinal: Negative for anorexia, heartburn, nausea and vomiting.   Genitourinary: Negative for dysuria, hematuria, hesitancy and nocturia.   Neurological: Negative for dizziness, light-headedness and loss of balance.   Psychiatric/Behavioral: Negative for depression and memory loss. The patient is nervous/anxious.            Objective     /76 (BP Location: Left arm)   Pulse 68   Ht 167.6 cm (65.98\")   Wt 113 kg (249 lb)   BMI 40.21 kg/m²     Physical Exam   Constitutional: She is oriented to person, place, and time. She appears well-developed and well-nourished.   HENT:   Head: Normocephalic and atraumatic.   Eyes: EOM are normal. Pupils are equal, round, and reactive to light.   Neck: Normal range of motion. Neck supple.   Cardiovascular: Normal rate and regular rhythm.   Murmur heard.  Pulmonary/Chest: Effort normal and breath sounds normal.   Abdominal: Soft.   Musculoskeletal: Normal range of motion. "   Neurological: She is alert and oriented to person, place, and time.   Skin: Skin is warm and dry.   Psychiatric: She has a normal mood and affect. Her behavior is normal.       Procedures    Assessment/Plan     HR and BP are stable today. Patient does report some palpitations but only once it is time for her third dose of nadolol.  She will be advised to continue on current, as the palpitations appear to be well managed on current regimen.  Blood pressure is stable, no additional medication will be added, or medication changes advised.  She is no longer wearing CPAP therapy at night for sleep apnea as she felt as though the mask was causing a great deal of sinusitis/allergy like symptoms. She denies any daytime drowsiness/fatigue.  According to patient, most recent TSH done with your office showed hypothyroidism well managed.  Patient reports you as following in regards.  She also has a history of hyperlipidemia but is not currently on medication in regards, and reports she is managing with diet alone. Patient thinks FLP was included in recent labs. Could we have a copy for our review?  She does report an allergy attack back in January that lasted for several weeks and then subsided by the end of February.  She admits she had hives and welts, but since that time has had no return.  She no longer follows with allergist. No new cardiac workup will be advised today as cardiac status appears stable and no new or worsening cardiac concerns are voiced. BMI elevated at 40.21, good cardiac diet with limited caloric intake, carbs, and activity as tolerated advised.  6 month follow up scheduled or sooner if needed.          Problems Addressed this Visit        Cardiovascular and Mediastinum    Palpitations    Hypercholesteremia    Essential hypertension - Primary    Relevant Medications    nadolol (CORGARD) 20 MG tablet       Respiratory    Sleep apnea in adult       Endocrine    Hypothyroidism    Relevant Medications     levothyroxine (SYNTHROID, LEVOTHROID) 100 MCG tablet    nadolol (CORGARD) 20 MG tablet      Other Visit Diagnoses     Environmental allergies        Morbid obesity with BMI of 40.0-44.9, adult (CMS/[facility])        Anxiety              Patient's Body mass index is 40.21 kg/m². BMI is above normal parameters. Recommendations include: nutrition counseling.              Electronically signed by DANNY Lara April 11, 2019 4:20 PM

## 2019-10-15 ENCOUNTER — OFFICE VISIT (OUTPATIENT)
Dept: CARDIOLOGY | Facility: CLINIC | Age: 44
End: 2019-10-15

## 2019-10-15 VITALS
BODY MASS INDEX: 39.18 KG/M2 | HEIGHT: 66 IN | WEIGHT: 243.8 LBS | DIASTOLIC BLOOD PRESSURE: 70 MMHG | SYSTOLIC BLOOD PRESSURE: 110 MMHG | HEART RATE: 64 BPM

## 2019-10-15 DIAGNOSIS — E88.81 METABOLIC SYNDROME: ICD-10-CM

## 2019-10-15 DIAGNOSIS — R00.2 PALPITATIONS: ICD-10-CM

## 2019-10-15 DIAGNOSIS — I10 ESSENTIAL HYPERTENSION: Primary | ICD-10-CM

## 2019-10-15 DIAGNOSIS — E03.9 ACQUIRED HYPOTHYROIDISM: ICD-10-CM

## 2019-10-15 DIAGNOSIS — E78.00 HYPERCHOLESTEREMIA: ICD-10-CM

## 2019-10-15 PROCEDURE — 99213 OFFICE O/P EST LOW 20 MIN: CPT | Performed by: NURSE PRACTITIONER

## 2019-10-15 RX ORDER — LEVOTHYROXINE SODIUM 0.12 MG/1
125 TABLET ORAL DAILY
COMMUNITY

## 2019-10-15 RX ORDER — NADOLOL 20 MG/1
TABLET ORAL
Qty: 90 TABLET | Refills: 12 | Status: SHIPPED | OUTPATIENT
Start: 2019-10-15 | End: 2020-12-09 | Stop reason: SDUPTHER

## 2019-10-15 RX ORDER — CHOLECALCIFEROL (VITAMIN D3) 125 MCG
1000 CAPSULE ORAL 2 TIMES DAILY
COMMUNITY

## 2019-10-15 NOTE — PROGRESS NOTES
Chief Complaint   Patient presents with   • Follow-up     Cardiac management.   • Palpitations     Having more frequent, she feels that since pharmacy has been having her to cut pills it is causing her not to get correct dose of Nadolol.   • Lab     Last labs 3 months ago per PCP.   • Shortness of Breath     Having more with anxiety.   • Chest Pain     Having sharp mid chest pains, started 2 weeks ago.    • Med Refill     Needs refills on Nadolol-30 day.       Subjective       Tierney Gupta is a 44 y.o. female with hypothyroidism, HTN, palpitations, sleep apnea, and significant metabolic syndrome.  She was seen about 4 years ago for palpitation.  Workup at that time showed normal LV systolic function, mild pulmonary hypertension and sinus tachycardia.  She was not able to tolerate Toprol and so Corgard was started.  She tolerated it well for the last few years.  She was then referred back because she had increasing palpitations and noticed that Corgard was not helping as it is done before. She had been having increasing shortness of breath. Holter and echo were advised.  Holter showed baseline NSR with highest heart rate at 151. Nadolol changed to Cardizem.  Echo showed normal LV function and no valvular concerns. She then felt like Cardizem was causing side effects, allergy like symptoms and it was stopped. She went back on nadolol with dose titrated and she felt better symptom control. She came today for follow up. Palpitations remain mostly well controlled but she feels when the pharmacy gives her a 40 mg tablet to cut in two, she doesn't get the correct dose. Requests script written for 20 mg TID. Palpitations are a feeling of heart racing and makes her feel uneasy. Does not skip or flutter. She limits caffeine. Labs checked by PCP 3 months ago.    HPI         Cardiac History:    Past Surgical History:   Procedure Laterality Date   • CARDIOVASCULAR STRESS TEST  08/28/2008    R. Stress- 6 Min, 30 Secs. 84% THR.  Negative   • CONVERTED (HISTORICAL) HOLTER  2008    AVG HR 82 BPM. Max-140 BPM.   • CONVERTED (HISTORICAL) HOLTER  07/10/2018    Baseline NSR with some sinus tach high 151, nadolol d/c and cardizem started   • ECHO - CONVERTED  2008    EF >60%. RVSP 33 mmHg.   • ECHO - CONVERTED  2018    EF 65%. RVSP- 16 mmHg.   • ECHO - CONVERTED  2018    EF 60-65%, mild MR, no AS, diastolic dysfunction, RSVP 16mmHg       Current Outpatient Medications   Medication Sig Dispense Refill   • albuterol (PROVENTIL HFA;VENTOLIN HFA) 108 (90 Base) MCG/ACT inhaler Inhale 2 puffs Every 4 (Four) Hours As Needed for Wheezing.     • Cholecalciferol (VITAMIN D3) 2000 units tablet Take  by mouth Daily.     • levothyroxine (SYNTHROID, LEVOTHROID) 125 MCG tablet Take 125 mcg by mouth Daily.     • nadolol (CORGARD) 20 MG tablet Three times daily 90 tablet 12   • sertraline (ZOLOFT) 100 MG tablet Take 100 mg by mouth Daily.       No current facility-administered medications for this visit.        Diphenhydramine and Aspirin    Past Medical History:   Diagnosis Date   • Dilatation of esophagus    • H/O hernia repair    • H/O total hysterectomy    • H/O:      x2   • History of carpal tunnel release    • History of cholecystectomy    • Hyperlipidemia    • Hypothyroidism    • Palpitations    • Sleep apnea     uses CPAP   • Tachycardia        Social History     Socioeconomic History   • Marital status: Single     Spouse name: Not on file   • Number of children: Not on file   • Years of education: Not on file   • Highest education level: Not on file   Tobacco Use   • Smoking status: Former Smoker   • Smokeless tobacco: Never Used   • Tobacco comment: recently quit smoking, currently wearing patch    Substance and Sexual Activity   • Alcohol use: No   • Drug use: No       Family History   Problem Relation Age of Onset   • Heart disease Mother    • Hypertension Mother    • Hyperlipidemia Mother    • Heart attack Father    •  "No Known Problems Brother        Review of Systems   Constitution: Positive for weight loss (down 6 lbdown 6 lb). Negative for decreased appetite and weakness.   HENT: Negative.    Eyes: Negative.    Cardiovascular: Positive for palpitations. Negative for chest pain, dyspnea on exertion, leg swelling and syncope.   Respiratory: Negative for cough.    Endocrine: Negative.    Hematologic/Lymphatic: Negative.    Skin: Negative.    Musculoskeletal: Negative.    Gastrointestinal: Negative for abdominal pain and melena.   Genitourinary: Negative for hematuria.   Neurological: Negative for dizziness.   Psychiatric/Behavioral: Negative for altered mental status. The patient is nervous/anxious.    Allergic/Immunologic: Negative.      Diabetes- No  Thyroid-normal    Objective     /70 (BP Location: Left arm)   Pulse 64   Ht 167.6 cm (65.98\")   Wt 111 kg (243 lb 12.8 oz)   BMI 39.37 kg/m²     Physical Exam   Constitutional: She is oriented to person, place, and time. She appears well-developed and well-nourished. No distress.   HENT:   Head: Normocephalic.   Eyes: Pupils are equal, round, and reactive to light.   Neck: Normal range of motion.   Cardiovascular: Normal rate, regular rhythm and intact distal pulses.   Pulmonary/Chest: Effort normal and breath sounds normal. No respiratory distress.   Abdominal: Soft. Bowel sounds are normal.   Musculoskeletal: Normal range of motion. She exhibits no edema.   Neurological: She is alert and oriented to person, place, and time.   Skin: Skin is warm and dry. She is not diaphoretic.   Psychiatric: She has a normal mood and affect.   Nursing note and vitals reviewed.     Procedures          Assessment/Plan      Tierney was seen today for follow-up, palpitations, lab, shortness of breath, chest pain and med refill.    Diagnoses and all orders for this visit:    Essential hypertension  -     nadolol (CORGARD) 20 MG tablet; Three times daily    Hypercholesteremia    Palpitations  -  "    nadolol (CORGARD) 20 MG tablet; Three times daily    Acquired hypothyroidism    Metabolic syndrome    Heart rate and blood pressure are well controlled. Rhythm is regular. Stress test, echo, and holter reviewed. Continue beta blocker. Nadolol 20 mg TID sent to her pharmacy. Limit caffeine. Adequate water hydration. Labs, including lipids and TSH followed by PCP. Heart healthy diet encouraged limiting carbohydrates and saturated fats. Regular walking encouraged. Continue weight loss efforts for BMI goal of 25. No new testing ordered today. She appears stable. We will see her back in six months or sooner if needed.     Patient's Body mass index is 39.37 kg/m². BMI is above normal parameters. Recommendations include: nutrition counseling.              Electronically signed by DANNY Mchugh,  October 19, 2019 11:22 AM

## 2019-10-15 NOTE — PATIENT INSTRUCTIONS
Mediterranean Diet  A Mediterranean diet refers to food and lifestyle choices that are based on the traditions of countries located on the [place]. This way of eating has been shown to help prevent certain conditions and improve outcomes for people who have chronic diseases, like kidney disease and heart disease.  What are tips for following this plan?  Lifestyle  · Cook and eat meals together with your family, when possible.  · Drink enough fluid to keep your urine clear or pale yellow.  · Be physically active every day. This includes:  ? Aerobic exercise like running or swimming.  ? Leisure activities like gardening, walking, or housework.  · Get 7-8 hours of sleep each night.  · If recommended by your health care provider, drink red wine in moderation. This means 1 glass a day for nonpregnant women and 2 glasses a day for men. A glass of wine equals 5 oz (150 mL).  Reading food labels    · Check the serving size of packaged foods. For foods such as rice and pasta, the serving size refers to the amount of cooked product, not dry.  · Check the total fat in packaged foods. Avoid foods that have saturated fat or trans fats.  · Check the ingredients list for added sugars, such as corn syrup.  Shopping  · At the grocery store, buy most of your food from the areas near the walls of the store. This includes:  ? Fresh fruits and vegetables (produce).  ? Grains, beans, nuts, and seeds. Some of these may be available in unpackaged forms or large amounts (in bulk).  ? Fresh seafood.  ? Poultry and eggs.  ? Low-fat dairy products.  · Buy whole ingredients instead of prepackaged foods.  · Buy fresh fruits and vegetables in-season from local farmers markets.  · Buy frozen fruits and vegetables in resealable bags.  · If you do not have access to quality fresh seafood, buy precooked frozen shrimp or canned fish, such as tuna, salmon, or sardines.  · Buy small amounts of raw or cooked vegetables, salads, or olives from  the deli or salad bar at your store.  · Stock your pantry so you always have certain foods on hand, such as olive oil, canned tuna, canned tomatoes, rice, pasta, and beans.  Cooking  · Cook foods with extra-virgin olive oil instead of using butter or other vegetable oils.  · Have meat as a side dish, and have vegetables or grains as your main dish. This means having meat in small portions or adding small amounts of meat to foods like pasta or stew.  · Use beans or vegetables instead of meat in common dishes like chili or lasagna.  · [place] with different cooking methods. Try roasting or broiling vegetables instead of steaming or sautéeing them.  · Add frozen vegetables to soups, stews, pasta, or rice.  · Add nuts or seeds for added healthy fat at each meal. You can add these to yogurt, salads, or vegetable dishes.  · Marinate fish or vegetables using olive oil, lemon juice, garlic, and fresh herbs.  Meal planning    · Plan to eat 1 vegetarian meal one day each week. Try to work up to 2 vegetarian meals, if possible.  · Eat seafood 2 or more times a week.  · Have healthy snacks readily available, such as:  ? Vegetable sticks with hummus.  ? Greek yogurt.  ? Fruit and nut trail mix.  · Eat balanced meals throughout the week. This includes:  ? Fruit: 2-3 servings a day  ? Vegetables: 4-5 servings a day  ? Low-fat dairy: 2 servings a day  ? Fish, poultry, or lean meat: 1 serving a day  ? Beans and legumes: 2 or more servings a week  ? Nuts and seeds: 1-2 servings a day  ? Whole grains: 6-8 servings a day  ? Extra-virgin olive oil: 3-4 servings a day  · Limit red meat and sweets to only a few servings a month  What are my food choices?  · Mediterranean diet  ? Recommended  ? Grains: Whole-grain pasta. Brown rice. Bulgar wheat. Polenta. Couscous. Whole-wheat bread. Oatmeal. Quinoa.  ? Vegetables: Artichokes. Beets. Broccoli. Cabbage. Carrots. Eggplant. Green beans. Chard. Kale. Spinach. Onions. Leeks. Peas. Squash.  Tomatoes. Peppers. Radishes.  ? Fruits: Apples. Apricots. Avocado. Berries. Bananas. Cherries. Dates. Figs. Grapes. Yonas. Melon. Oranges. Peaches. Plums. Pomegranate.  ? Meats and other protein foods: Beans. Almonds. Sunflower seeds. Pine nuts. Peanuts. Cod. [place]. Scallops. Shrimp. Tuna. Tilapia. Clams. Oysters. Eggs.  ? Dairy: Low-fat milk. Cheese. Greek yogurt.  ? Beverages: Water. Red wine. Herbal tea.  ? Fats and oils: Extra virgin olive oil. Avocado oil. Grape seed oil.  ? Sweets and desserts: Greek yogurt with honey. Baked apples. Poached pears. Trail mix.  ? Seasoning and other foods: Basil. Cilantro. Coriander. Cumin. Mint. Parsley. [place]. Rosemary. Tarragon. Garlic. Oregano. Thyme. Pepper. Balsalmic vinegar. Tahini. Hummus. Tomato sauce. Olives. Mushrooms.  ? Limit these  ? Grains: Prepackaged pasta or rice dishes. Prepackaged cereal with added sugar.  ? Vegetables: Deep fried potatoes (french fries).  ? Fruits: Fruit canned in syrup.  ? Meats and other protein foods: Beef. Pork. Lamb. Poultry with skin. Hot dogs. Mcdonough.  ? Dairy: Ice cream. Sour cream. Whole milk.  ? Beverages: Juice. Sugar-sweetened soft drinks. Beer. Liquor and spirits.  ? Fats and oils: Butter. Canola oil. Vegetable oil. Beef fat (tallow). Lard.  ? Sweets and desserts: Cookies. Cakes. Pies. Candy.  ? Seasoning and other foods: Mayonnaise. Premade sauces and marinades.  ? The items listed may not be a complete list. Talk with your dietitian about what dietary choices are right for you.  Summary  · The Mediterranean diet includes both food and lifestyle choices.  · Eat a variety of fresh fruits and vegetables, beans, nuts, seeds, and whole grains.  · Limit the amount of red meat and sweets that you eat.  · Talk with your health care provider about whether it is safe for you to drink red wine in moderation. This means 1 glass a day for nonpregnant women and 2 glasses a day for men. A glass of wine equals 5 oz (150 mL).  This information  is not intended to replace advice given to you by your health care provider. Make sure you discuss any questions you have with your health care provider.  Document Released: 08/10/2017 Document Revised: 09/12/2017 Document Reviewed: 08/10/2017  [employer] Patient Education © 2019 [employer]

## 2020-05-20 ENCOUNTER — OFFICE VISIT (OUTPATIENT)
Dept: CARDIOLOGY | Facility: CLINIC | Age: 45
End: 2020-05-20

## 2020-05-20 VITALS
HEART RATE: 70 BPM | DIASTOLIC BLOOD PRESSURE: 82 MMHG | WEIGHT: 238 LBS | HEIGHT: 66 IN | BODY MASS INDEX: 38.25 KG/M2 | SYSTOLIC BLOOD PRESSURE: 130 MMHG

## 2020-05-20 DIAGNOSIS — I10 ESSENTIAL HYPERTENSION: ICD-10-CM

## 2020-05-20 DIAGNOSIS — R00.2 PALPITATIONS: ICD-10-CM

## 2020-05-20 DIAGNOSIS — G47.30 SLEEP APNEA IN ADULT: ICD-10-CM

## 2020-05-20 DIAGNOSIS — E78.00 HYPERCHOLESTEREMIA: ICD-10-CM

## 2020-05-20 DIAGNOSIS — E66.01 MORBID OBESITY WITH BMI OF 40.0-44.9, ADULT (HCC): ICD-10-CM

## 2020-05-20 PROCEDURE — 99213 OFFICE O/P EST LOW 20 MIN: CPT | Performed by: NURSE PRACTITIONER

## 2020-05-20 RX ORDER — ASCORBIC ACID 500 MG
500 TABLET ORAL 2 TIMES DAILY
COMMUNITY
End: 2020-12-09 | Stop reason: ALTCHOICE

## 2020-05-20 NOTE — PATIENT INSTRUCTIONS
Physical Activity With Heart Disease  Being active has many benefits, especially if you have heart disease. Physical activity can help you do more and feel healthier. Start slowly, and increase the amount of time you spend being active. Most adults should aim for physical activity that:  · Makes you breathe harder and raises your heart rate (aerobic activity). Try to get at least 150 minutes of aerobic activity each week. This is about 30 minutes each day, 5 days a week.  · Helps build muscle strength (strengthening activity). Do this at least 2 times a week.  Always talk with your health care provider before starting any new activity program or if you have any changes in your condition.  What are the benefits of physical activity?  When you have heart disease, physical activity can help:  · Lower your blood pressure.  · Lower your cholesterol.  · Control your weight.  · Improve your sleep.  · Help control your blood sugar.  · Improve your heart and lung function.  · Reduce your risk for blood clots (thrombophlebitis).  · Improve your energy level.  · Reduce stress.  What are some types of physical activity I could try?  There are many ways to be active. Talk with your health care provider about what types and intensity of activity is right for you.  Aerobic activity    Aerobic (cardiovascular) activity can be moderate or vigorous intensity, depending on how hard you are working.  Moderate-intensity activity includes:  · Walking.  · Slow bicycling.  · Water aerobics.  · Dancing.  · Light gardening or house work.  Vigorous-intensity activity includes:  · Jogging or running.  · Stair climbing.  · Swimming laps.  · Hiking uphill.  · Heavy gardening, such as digging trenches.    Strengthening activity  Strengthening activities work your muscles to build strength. Some examples include:  · Doing push-ups, sit-ups, or pull-ups.  · Lifting small weights.  · Using resistance bands.    Flexibility  Flexibility activities  lengthen your muscles to keep them flexible and less tight and improve your balance. Some examples include:  · Stretching.  · Yoga.  · Angel chi.  · Ballet barre.    Follow these instructions at home:  How to get started  · Talk with your health care provider about:  ? What types of activities are safe for you.  ? If you should check your pulse or take other precautions during physical activity.  · Get a calendar. Write down a schedule and plan for your new routine.  · Take time to find out what works for you. Consider:  ? Joining a community program, such as a biking group, yoga class, local gym, or swimming pool membership.  ? Be active on your own by downloading free workout applications on a smartphone or other devices, or by purchasing workout DVDs.  · If you have not been active, begin with sessions that last 10-15 minutes. Gradually work up to sessions that last 20-30 minutes, 5 times a week. Follow all of your health care provider's recommendations.  · Be patient with yourself. It takes time to build up strength and lung capacity.  Safety  · Exercise in an indoor, climate-controlled facility, as told by your health care provider. You may need to do this if:  ? There are extreme outdoor conditions, such as heat, humidity, or cold.  ? There is an air pollution advisory. Your local news, board of health, or hospital can provide information on air quality.  · Take extra precautions as told by your health care provider. This may include:  ? Monitoring your heart rate.  ? Avoiding heavy lifting.  ? Understanding how your medicines can affect you during physical activity. Certain medicines may cause heat intolerance or changes in blood sugar.  ? Slowing down to rest when you need to.  ? Keeping nitroglycerin spray and tablets with you at all times if you have angina. Use them as told to prevent and treat symptoms.  · Drink plenty of water before, during, and after physical activity.  · Know what symptoms may be signs  of a problem. Stop physical activity right away if you have any of these symptoms.  Get help right away if you have any of the following during exercise:  · Chest pain, shortness of breath, or feel very tired.  · Pain in the arm, shoulder, neck, or jaw.  · Feel weak, dizzy, or light-headed.  · An irregular heart rate, or your heart rate is greater than 100 beats per minute (bpm) before exercise.  These symptoms may represent a serious problem that is an emergency. Do not wait to see if the symptoms go away. Get medical help right away. Call your local emergency services (911 in the [place] Do not drive yourself to the hospital.   Summary  · Physical activity has many benefits, especially if you have heart disease.  · Before starting an activity program, talk with your health care provider about how often to be active and what type of activity is safe for you.  · Your physical activity plan may include moderate or vigorous aerobic activity, strengthening activities, and flexibility.  · Know what symptoms may be signs of a problem. Stop physical activity right away and call emergency services (911 in the [place] if you have any of these symptoms.  This information is not intended to replace advice given to you by your health care provider. Make sure you discuss any questions you have with your health care provider.  Document Released: 07/15/2015 Document Revised: 01/09/2019 Document Reviewed: 01/09/2019  [employer] Interactive Patient Education © 2020 [employer]  Palpitations  Palpitations are feelings that your heartbeat is irregular or is faster than normal. It may feel like your heart is fluttering or skipping a beat. Palpitations are usually not a serious problem. They may be caused by many things, including smoking, caffeine, alcohol, stress, and certain medicines or drugs. Most causes of palpitations are not serious. However, some palpitations can be a sign of a serious problem. You may need further tests to rule  out serious medical problems.  Follow these instructions at home:         Pay attention to any changes in your condition. Take these actions to help manage your symptoms:  Eating and drinking  · Avoid foods and drinks that may cause palpitations. These may include:  ? Caffeinated coffee, tea, soft drinks, diet pills, and energy drinks.  ? Chocolate.  ? Alcohol.  Lifestyle  · Take steps to reduce your stress and anxiety. Things that can help you relax include:  ? Yoga.  ? Mind-body activities, such as deep breathing, meditation, or using words and images to create positive thoughts (guided imagery).  ? Physical activity, such as swimming, jogging, or walking. Tell your health care provider if your palpitations increase with activity. If you have chest pain or shortness of breath with activity, do not continue the activity until you are seen by your health care provider.  ? Biofeedback. This is a method that helps you learn to use your mind to control things in your body, such as your heartbeat.  · Do not use drugs, including cocaine or ecstasy. Do not use marijuana.  · Get plenty of rest and sleep. Keep a regular bed time.  General instructions  · Take over-the-counter and prescription medicines only as told by your health care provider.  · Do not use any products that contain nicotine or tobacco, such as cigarettes and e-cigarettes. If you need help quitting, ask your health care provider.  · Keep all follow-up visits as told by your health care provider. This is important. These may include visits for further testing if palpitations do not go away or get worse.  Contact a health care provider if you:  · Continue to have a fast or irregular heartbeat after 24 hours.  · Notice that your palpitations occur more often.  Get help right away if you:  · Have chest pain or shortness of breath.  · Have a severe headache.  · Feel dizzy or you faint.  Summary  · Palpitations are feelings that your heartbeat is irregular or is  faster than normal. It may feel like your heart is fluttering or skipping a beat.  · Palpitations may be caused by many things, including smoking, caffeine, alcohol, stress, certain medicines, and drugs.  · Although most causes of palpitations are not serious, some causes can be a sign of a serious medical problem.  · Get help right away if you faint or have chest pain, shortness of breath, a severe headache, or dizziness.  This information is not intended to replace advice given to you by your health care provider. Make sure you discuss any questions you have with your health care provider.  Document Released: 12/15/2001 Document Revised: 01/30/2019 Document Reviewed: 01/30/2019  [employer]vier Patient Education © 2020 [employer]  Mediterranean Diet  A Mediterranean diet refers to food and lifestyle choices that are based on the traditions of countries located on the [place]. This way of eating has been shown to help prevent certain conditions and improve outcomes for people who have chronic diseases, like kidney disease and heart disease.  What are tips for following this plan?  Lifestyle  · Cook and eat meals together with your family, when possible.  · Drink enough fluid to keep your urine clear or pale yellow.  · Be physically active every day. This includes:  ? Aerobic exercise like running or swimming.  ? Leisure activities like gardening, walking, or housework.  · Get 7-8 hours of sleep each night.  · If recommended by your health care provider, drink red wine in moderation. This means 1 glass a day for nonpregnant women and 2 glasses a day for men. A glass of wine equals 5 oz (150 mL).  Reading food labels    · Check the serving size of packaged foods. For foods such as rice and pasta, the serving size refers to the amount of cooked product, not dry.  · Check the total fat in packaged foods. Avoid foods that have saturated fat or trans fats.  · Check the ingredients list for added sugars, such as  corn syrup.  Shopping  · At the grocery store, buy most of your food from the areas near the walls of the store. This includes:  ? Fresh fruits and vegetables (produce).  ? Grains, beans, nuts, and seeds. Some of these may be available in unpackaged forms or large amounts (in bulk).  ? Fresh seafood.  ? Poultry and eggs.  ? Low-fat dairy products.  · Buy whole ingredients instead of prepackaged foods.  · Buy fresh fruits and vegetables in-season from local farmers markets.  · Buy frozen fruits and vegetables in resealable bags.  · If you do not have access to quality fresh seafood, buy precooked frozen shrimp or canned fish, such as tuna, salmon, or sardines.  · Buy small amounts of raw or cooked vegetables, salads, or olives from the deli or salad bar at your store.  · Stock your pantry so you always have certain foods on hand, such as olive oil, canned tuna, canned tomatoes, rice, pasta, and beans.  Cooking  · Cook foods with extra-virgin olive oil instead of using butter or other vegetable oils.  · Have meat as a side dish, and have vegetables or grains as your main dish. This means having meat in small portions or adding small amounts of meat to foods like pasta or stew.  · Use beans or vegetables instead of meat in common dishes like chili or lasagna.  · [place] with different cooking methods. Try roasting or broiling vegetables instead of steaming or sautéeing them.  · Add frozen vegetables to soups, stews, pasta, or rice.  · Add nuts or seeds for added healthy fat at each meal. You can add these to yogurt, salads, or vegetable dishes.  · Marinate fish or vegetables using olive oil, lemon juice, garlic, and fresh herbs.  Meal planning    · Plan to eat 1 vegetarian meal one day each week. Try to work up to 2 vegetarian meals, if possible.  · Eat seafood 2 or more times a week.  · Have healthy snacks readily available, such as:  ? Vegetable sticks with hummus.  ? Greek yogurt.  ? Fruit and nut trail  mix.  · Eat balanced meals throughout the week. This includes:  ? Fruit: 2-3 servings a day  ? Vegetables: 4-5 servings a day  ? Low-fat dairy: 2 servings a day  ? Fish, poultry, or lean meat: 1 serving a day  ? Beans and legumes: 2 or more servings a week  ? Nuts and seeds: 1-2 servings a day  ? Whole grains: 6-8 servings a day  ? Extra-virgin olive oil: 3-4 servings a day  · Limit red meat and sweets to only a few servings a month  What are my food choices?  · Mediterranean diet  ? Recommended  ? Grains: Whole-grain pasta. Brown rice. Bulgar wheat. Polenta. Couscous. Whole-wheat bread. Oatmeal. Quinoa.  ? Vegetables: Artichokes. Beets. Broccoli. Cabbage. Carrots. Eggplant. Green beans. Chard. Kale. Spinach. Onions. Leeks. Peas. Squash. Tomatoes. Peppers. Radishes.  ? Fruits: Apples. Apricots. Avocado. Berries. Bananas. Cherries. Dates. Figs. Grapes. Yonas. Melon. Oranges. Peaches. Plums. Pomegranate.  ? Meats and other protein foods: Beans. Almonds. Sunflower seeds. Pine nuts. Peanuts. Cod. Coolin. Scallops. Shrimp. Tuna. Tilapia. Clams. Oysters. Eggs.  ? Dairy: Low-fat milk. Cheese. Greek yogurt.  ? Beverages: Water. Red wine. Herbal tea.  ? Fats and oils: Extra virgin olive oil. Avocado oil. Grape seed oil.  ? Sweets and desserts: Greek yogurt with honey. Baked apples. Poached pears. Trail mix.  ? Seasoning and other foods: Basil. Cilantro. Coriander. Cumin. Mint. Parsley. Kike. Rosemary. Tarragon. Garlic. Oregano. Thyme. Pepper. Balsalmic vinegar. Tahini. Hummus. Tomato sauce. Olives. Mushrooms.  ? Limit these  ? Grains: Prepackaged pasta or rice dishes. Prepackaged cereal with added sugar.  ? Vegetables: Deep fried potatoes (french fries).  ? Fruits: Fruit canned in syrup.  ? Meats and other protein foods: Beef. Pork. Lamb. Poultry with skin. Hot dogs. Mcdonough.  ? Dairy: Ice cream. Sour cream. Whole milk.  ? Beverages: Juice. Sugar-sweetened soft drinks. Beer. Liquor and spirits.  ? Fats and oils: Butter.  Canola oil. Vegetable oil. Beef fat (tallow). Lard.  ? Sweets and desserts: Cookies. Cakes. Pies. Candy.  ? Seasoning and other foods: Mayonnaise. Premade sauces and marinades.  ? The items listed may not be a complete list. Talk with your dietitian about what dietary choices are right for you.  Summary  · The Mediterranean diet includes both food and lifestyle choices.  · Eat a variety of fresh fruits and vegetables, beans, nuts, seeds, and whole grains.  · Limit the amount of red meat and sweets that you eat.  · Talk with your health care provider about whether it is safe for you to drink red wine in moderation. This means 1 glass a day for nonpregnant women and 2 glasses a day for men. A glass of wine equals 5 oz (150 mL).  This information is not intended to replace advice given to you by your health care provider. Make sure you discuss any questions you have with your health care provider.  Document Released: 08/10/2017 Document Revised: 09/12/2017 Document Reviewed: 08/10/2017  [employer] Interactive Patient Education © 2020 [employer]    Sleep Apnea  Sleep apnea is a condition in which breathing pauses or becomes shallow during sleep. Episodes of sleep apnea usually last 10 seconds or longer, and they may occur as many as 20 times an hour. Sleep apnea disrupts your sleep and keeps your body from getting the rest that it needs. This condition can increase your risk of certain health problems, including:  · Heart attack.  · Stroke.  · Obesity.  · Diabetes.  · Heart failure.  · Irregular heartbeat.  What are the causes?  There are three kinds of sleep apnea:  · Obstructive sleep apnea. This kind is caused by a blocked or collapsed airway.  · Central sleep apnea. This kind happens when the part of the brain that controls breathing does not send the correct signals to the muscles that control breathing.  · Mixed sleep apnea. This is a combination of obstructive and central sleep apnea.  The most common cause of  this condition is a collapsed or blocked airway. An airway can collapse or become blocked if:  · Your throat muscles are abnormally relaxed.  · Your tongue and tonsils are larger than normal.  · You are overweight.  · Your airway is smaller than normal.  What increases the risk?  You are more likely to develop this condition if you:  · Are overweight.  · Smoke.  · Have a smaller than normal airway.  · Are elderly.  · Are male.  · Drink alcohol.  · Take sedatives or tranquilizers.  · Have a family history of sleep apnea.  What are the signs or symptoms?  Symptoms of this condition include:  · Trouble staying asleep.  · Daytime sleepiness and tiredness.  · Irritability.  · Loud snoring.  · Morning headaches.  · Trouble concentrating.  · Forgetfulness.  · Decreased interest in sex.  · Unexplained sleepiness.  · Mood swings.  · Personality changes.  · Feelings of depression.  · Waking up often during the night to urinate.  · Dry mouth.  · Sore throat.  How is this diagnosed?  This condition may be diagnosed with:  · A medical history.  · A physical exam.  · A series of tests that are done while you are sleeping (sleep study). These tests are usually done in a sleep lab, but they may also be done at home.  How is this treated?  Treatment for this condition aims to restore normal breathing and to ease symptoms during sleep. It may involve managing health issues that can affect breathing, such as high blood pressure or obesity. Treatment may include:  · Sleeping on your side.  · Using a decongestant if you have nasal congestion.  · Avoiding the use of depressants, including alcohol, sedatives, and narcotics.  · Losing weight if you are overweight.  · Making changes to your diet.  · Quitting smoking.  · Using a device to open your airway while you sleep, such as:  ? An oral appliance. This is a custom-made mouthpiece that shifts your lower jaw forward.  ? A continuous positive airway pressure (CPAP) device. This device  blows air through a mask when you breathe out (exhale).  ? A nasal expiratory positive airway pressure (EPAP) device. This device has valves that you put into each nostril.  ? A bi-level positive airway pressure (BPAP) device. This device blows air through a mask when you breathe in (inhale) and breathe out (exhale).  · Having surgery if other treatments do not work. During surgery, excess tissue is removed to create a wider airway.  It is important to get treatment for sleep apnea. Without treatment, this condition can lead to:  · High blood pressure.  · Coronary artery disease.  · In men, an inability to achieve or maintain an erection (impotence).  · Reduced thinking abilities.  Follow these instructions at home:  Lifestyle  · Make any lifestyle changes that your health care provider recommends.  · Eat a healthy, well-balanced diet.  · Take steps to lose weight if you are overweight.  · Avoid using depressants, including alcohol, sedatives, and narcotics.  · Do not use any products that contain nicotine or tobacco, such as cigarettes, e-cigarettes, and chewing tobacco. If you need help quitting, ask your health care provider.  General instructions  · Take over-the-counter and prescription medicines only as told by your health care provider.  · If you were given a device to open your airway while you sleep, use it only as told by your health care provider.  · If you are having surgery, make sure to tell your health care provider you have sleep apnea. You may need to bring your device with you.  · Keep all follow-up visits as told by your health care provider. This is important.  Contact a health care provider if:  · The device that you received to open your airway during sleep is uncomfortable or does not seem to be working.  · Your symptoms do not improve.  · Your symptoms get worse.  Get help right away if:  · You develop:  ? Chest pain.  ? Shortness of breath.  ? Discomfort in your back, arms, or  stomach.  · You have:  ? Trouble speaking.  ? Weakness on one side of your body.  ? Drooping in your face.  These symptoms may represent a serious problem that is an emergency. Do not wait to see if the symptoms will go away. Get medical help right away. Call your local emergency services (911 in the [place] Do not drive yourself to the hospital.  Summary  · Sleep apnea is a condition in which breathing pauses or becomes shallow during sleep.  · The most common cause is a collapsed or blocked airway.  · The goal of treatment is to restore normal breathing and to ease symptoms during sleep.  This information is not intended to replace advice given to you by your health care provider. Make sure you discuss any questions you have with your health care provider.  Document Released: 12/08/2003 Document Revised: 10/04/2019 Document Reviewed: 08/13/2019  [employer] Patient Education © 2020 [employer]

## 2020-05-20 NOTE — PROGRESS NOTES
"Chief Complaint   Patient presents with   • Follow-up     Cardiac management  .Has occasional episodes of flutering . Had labs done per PCP .   • Shortness of Breath     Has diagnosis of Asthma. Uses inhalers   • Med Refill     No refills needed . Reviewed meds verbally.   • Palpitations     Palpitations feel like are coming up before time of next dose of nadalol is due.       Subjective       Tierney Gupta is a 45 y.o. female with hypothyroidism, HTN, palpitations, sleep apnea, and significant metabolic syndrome.  She was seen about 4 years ago for palpitation.  Workup at that time showed normal LV systolic function, mild pulmonary hypertension and sinus tachycardia.  She was not able to tolerate Toprol and so Corgard was started.  She tolerated it well for the last few years.  She was then referred back because she had increasing palpitations and noticed that Corgard was not helping as it is done before. She had been having increasing shortness of breath. Holter and echo were advised.  Holter showed baseline NSR with highest heart rate at 151. Nadolol changed to Cardizem.  Echo showed normal LV function and no valvular concerns. She then felt like Cardizem was causing side effects, allergy like symptoms and it was stopped. She went back on nadolol with dose titrated and she felt better symptom control.     Today she comes to the office for a follow up visit. She admits to mild palpitations occurring close to time for medication to be taken. No associated symptoms occur. She has had surgery on her right foot and has not been as active. She is trying to watch diet better and has lost a few pounds. Currently not using CPAP due to equipment \"not good\". She plans to purchase new mask and tubing.       HPI     Cardiac History:    Past Surgical History:   Procedure Laterality Date   • CARDIOVASCULAR STRESS TEST  08/28/2008    R. Stress- 6 Min, 30 Secs. 84% THR. Negative   • CONVERTED (HISTORICAL) HOLTER  07/03/2008    AVG HR " 82 BPM. Max-140 BPM.   • CONVERTED (HISTORICAL) HOLTER  07/10/2018    Baseline NSR with some sinus tach high 151, nadolol d/c and cardizem started   • ECHO - CONVERTED  2008    EF >60%. RVSP 33 mmHg.   • ECHO - CONVERTED  2018    EF 65%. RVSP- 16 mmHg.   • ECHO - CONVERTED  2018    EF 60-65%, mild MR, no AS, diastolic dysfunction, RSVP 16mmHg       Current Outpatient Medications   Medication Sig Dispense Refill   • albuterol (PROVENTIL HFA;VENTOLIN HFA) 108 (90 Base) MCG/ACT inhaler Inhale 2 puffs Every 4 (Four) Hours As Needed for Wheezing.     • Cholecalciferol (VITAMIN D3) 2000 units tablet Take  by mouth Daily.     • levothyroxine (SYNTHROID, LEVOTHROID) 125 MCG tablet Take 125 mcg by mouth Daily.     • nadolol (CORGARD) 20 MG tablet Three times daily 90 tablet 12   • sertraline (ZOLOFT) 100 MG tablet Take 100 mg by mouth Daily.     • vitamin C (ASCORBIC ACID) 500 MG tablet Take 500 mg by mouth 2 (Two) Times a Day.       No current facility-administered medications for this visit.        Diphenhydramine and Aspirin    Past Medical History:   Diagnosis Date   • Dilatation of esophagus    • H/O hernia repair    • H/O total hysterectomy    • H/O:      x2   • History of carpal tunnel release    • History of cholecystectomy    • Hyperlipidemia    • Hypothyroidism    • Palpitations    • Sleep apnea     uses CPAP   • Tachycardia        Social History     Socioeconomic History   • Marital status: Single     Spouse name: Not on file   • Number of children: Not on file   • Years of education: Not on file   • Highest education level: Not on file   Tobacco Use   • Smoking status: Former Smoker   • Smokeless tobacco: Never Used   • Tobacco comment: recently quit smoking, currently wearing patch    Substance and Sexual Activity   • Alcohol use: No   • Drug use: No       Family History   Problem Relation Age of Onset   • Heart disease Mother    • Hypertension Mother    • Hyperlipidemia Mother    •  "Heart attack Father    • No Known Problems Brother        Review of Systems   Constitution: Positive for weight loss (5 pounds). Negative for decreased appetite.   HENT: Negative.    Eyes: Negative.    Cardiovascular: Positive for palpitations. Negative for chest pain, dyspnea on exertion and near-syncope.   Respiratory: Positive for shortness of breath and wheezing (not often, uses inhaler as needed). Negative for cough and sleep disturbances due to breathing (currently denies issues, currently not using CPAP).    Endocrine: Negative for polydipsia, polyphagia and polyuria.   Hematologic/Lymphatic: Negative for bleeding problem. Does not bruise/bleed easily.   Skin: Negative.    Musculoskeletal: Positive for joint pain (right foot/ankle, currently in brace post surgery) and stiffness. Negative for muscle cramps and muscle weakness.   Gastrointestinal: Negative.    Genitourinary: Negative.    Neurological: Negative.    Psychiatric/Behavioral: Negative.    Allergic/Immunologic: Negative.         Objective     /82 (BP Location: Right arm)   Pulse 70   Ht 167.6 cm (65.98\")   Wt 108 kg (238 lb)   BMI 38.44 kg/m²     Physical Exam   Constitutional: She is oriented to person, place, and time. Vital signs are normal. She appears well-nourished. No distress.   HENT:   Head: Normocephalic.   Eyes: Pupils are equal, round, and reactive to light. Conjunctivae are normal.   Neck: Normal range of motion. Neck supple. Carotid bruit is not present.   Cardiovascular: Normal rate, regular rhythm and S1 normal.   No murmur heard.  Pulses:       Radial pulses are 2+ on the right side, and 2+ on the left side.   Slightly loud S2   Pulmonary/Chest: Breath sounds normal. She has no wheezes. She has no rales.   Abdominal: Soft. Bowel sounds are normal.   Musculoskeletal: Normal range of motion. She exhibits tenderness (right ankle area). She exhibits no edema.   Neurological: She is alert and oriented to person, place, and time. "   Skin: Skin is warm and dry. No pallor.   Psychiatric: She has a normal mood and affect. Her behavior is normal.        Procedures: none today         Assessment/Plan      Tierney was seen today for follow-up, shortness of breath, med refill and palpitations.    Diagnoses and all orders for this visit:    Essential hypertension    Hypercholesteremia    Morbid obesity with BMI of 40.0-44.9, adult (CMS/HCC)    Palpitations    Sleep apnea in adult      HTN- BP stable. Continue beta blocker. Also encouraged diet DASH diet and weight loss.     Hypercholesterolemia/obesity- Patient's Body mass index is 38.44 kg/m². BMI is above normal parameters. Recommendations include: nutrition counseling. Mediterranean diet information given to her.  Cholesterol curently managed by diet. According to patient recent labs were good. Please forward a copy of lab results.     Palpitations- continue Nadolol. If palpitations worsen then understands to call and increase dose of medication will be considered. We discussed taking extra dose if needed for persistent palpitations. Handout on management of palpitations and given to her.     Sleep apnea- currently she is not using CPAP. She plans to obtain new mask and supplies. I encouraged her on treatment of sleep apnea to decrease cardiac risk including palpitations. Handout on sleep apnea given to her.     Tierney Gupta  reports that she has quit smoking. She has never used smokeless tobacco.     A 6 month follow up visit scheduled. Please call sooner for cardiac concerns.            Electronically signed by DANNY Fairbanks,  May 20, 2020 16:12

## 2020-12-09 ENCOUNTER — OFFICE VISIT (OUTPATIENT)
Dept: CARDIOLOGY | Facility: CLINIC | Age: 45
End: 2020-12-09

## 2020-12-09 VITALS
TEMPERATURE: 98.4 F | SYSTOLIC BLOOD PRESSURE: 122 MMHG | HEIGHT: 66 IN | DIASTOLIC BLOOD PRESSURE: 70 MMHG | WEIGHT: 236 LBS | HEART RATE: 62 BPM | BODY MASS INDEX: 37.93 KG/M2

## 2020-12-09 DIAGNOSIS — E78.00 HYPERCHOLESTEREMIA: ICD-10-CM

## 2020-12-09 DIAGNOSIS — I10 ESSENTIAL HYPERTENSION: Primary | ICD-10-CM

## 2020-12-09 DIAGNOSIS — G47.30 SLEEP APNEA IN ADULT: ICD-10-CM

## 2020-12-09 DIAGNOSIS — E88.81 METABOLIC SYNDROME: ICD-10-CM

## 2020-12-09 DIAGNOSIS — R00.2 PALPITATIONS: ICD-10-CM

## 2020-12-09 PROBLEM — E66.01 MORBID OBESITY WITH BMI OF 40.0-44.9, ADULT (HCC): Status: RESOLVED | Noted: 2020-05-20 | Resolved: 2020-12-09

## 2020-12-09 PROCEDURE — 99213 OFFICE O/P EST LOW 20 MIN: CPT | Performed by: NURSE PRACTITIONER

## 2020-12-09 RX ORDER — NADOLOL 20 MG/1
TABLET ORAL
Qty: 90 TABLET | Refills: 12 | Status: SHIPPED | OUTPATIENT
Start: 2020-12-09 | End: 2021-12-09 | Stop reason: SDUPTHER

## 2020-12-09 NOTE — PATIENT INSTRUCTIONS
Physical Activity With Heart Disease  Being active has many benefits, especially if you have heart disease. Physical activity can help you do more and feel healthier. Start slowly, and increase the amount of time you spend being active. Most adults should aim for physical activity that:  · Makes you breathe harder and raises your heart rate (aerobic activity). Try to get at least 150 minutes of aerobic activity each week. This is about 30 minutes each day, 5 days a week.  · Helps build muscle strength (strengthening activity). Do this at least 2 times a week.  Always talk with your health care provider before starting any new activity program or if you have any changes in your condition.  What are the benefits of physical activity?  When you have heart disease, physical activity can help:  · Lower your blood pressure.  · Lower your cholesterol.  · Control your weight.  · Improve your sleep.  · Help control your blood sugar.  · Improve your heart and lung function.  · Reduce your risk for blood clots (thrombophlebitis).  · Improve your energy level.  · Reduce stress.  What are some types of physical activity I could try?  There are many ways to be active. Talk with your health care provider about what types and intensity of activity is right for you.  Aerobic activity    Aerobic (cardiovascular) activity can be moderate or vigorous intensity, depending on how hard you are working.  Moderate-intensity activity includes:  · Walking.  · Slow bicycling.  · Water aerobics.  · Dancing.  · Light gardening or house work.  Vigorous-intensity activity includes:  · Jogging or running.  · Stair climbing.  · Swimming laps.  · Hiking uphill.  · Heavy gardening, such as digging trenches.    Strengthening activity  Strengthening activities work your muscles to build strength. Some examples include:  · Doing push-ups, sit-ups, or pull-ups.  · Lifting small weights.  · Using resistance bands.    Flexibility  Flexibility activities  lengthen your muscles to keep them flexible and less tight and improve your balance. Some examples include:  · Stretching.  · Yoga.  · Angel chi.  · Ballet barre.    Follow these instructions at home:  How to get started  · Talk with your health care provider about:  ? What types of activities are safe for you.  ? If you should check your pulse or take other precautions during physical activity.  · Get a calendar. Write down a schedule and plan for your new routine.  · Take time to find out what works for you. Consider:  ? Joining a community program, such as a biking group, yoga class, local gym, or swimming pool membership.  ? Be active on your own by downloading free workout applications on a smartphone or other devices, or by purchasing workout DVDs.  · If you have not been active, begin with sessions that last 10-15 minutes. Gradually work up to sessions that last 20-30 minutes, 5 times a week. Follow all of your health care provider's recommendations.  · Be patient with yourself. It takes time to build up strength and lung capacity.  Safety  · Exercise in an indoor, climate-controlled facility, as told by your health care provider. You may need to do this if:  ? There are extreme outdoor conditions, such as heat, humidity, or cold.  ? There is an air pollution advisory. Your local news, board of health, or hospital can provide information on air quality.  · Take extra precautions as told by your health care provider. This may include:  ? Monitoring your heart rate.  ? Avoiding heavy lifting.  ? Understanding how your medicines can affect you during physical activity. Certain medicines may cause heat intolerance or changes in blood sugar.  ? Slowing down to rest when you need to.  ? Keeping nitroglycerin spray and tablets with you at all times if you have angina. Use them as told to prevent and treat symptoms.  · Drink plenty of water before, during, and after physical activity.  · Know what symptoms may be signs  of a problem. Stop physical activity right away if you have any of these symptoms.  Get help right away if you have any of the following during exercise:  · Chest pain, shortness of breath, or feel very tired.  · Pain in the arm, shoulder, neck, or jaw.  · Feel weak, dizzy, or light-headed.  · An irregular heart rate, or your heart rate is greater than 100 beats per minute (bpm) before exercise.  These symptoms may represent a serious problem that is an emergency. Do not wait to see if the symptoms go away. Get medical help right away. Call your local emergency services (911 in the [place] Do not drive yourself to the hospital.   Summary  · Physical activity has many benefits, especially if you have heart disease.  · Before starting an activity program, talk with your health care provider about how often to be active and what type of activity is safe for you.  · Your physical activity plan may include moderate or vigorous aerobic activity, strengthening activities, and flexibility.  · Know what symptoms may be signs of a problem. Stop physical activity right away and call emergency services (911 in the [place] if you have any of these symptoms.  This information is not intended to replace advice given to you by your health care provider. Make sure you discuss any questions you have with your health care provider.  Document Revised: 01/09/2019 Document Reviewed: 01/09/2019  Elsevier Patient Education © 2020 [employer]  Mediterranean Diet  A Mediterranean diet refers to food and lifestyle choices that are based on the traditions of countries located on the [place]. This way of eating has been shown to help prevent certain conditions and improve outcomes for people who have chronic diseases, like kidney disease and heart disease.  What are tips for following this plan?  Lifestyle  · Cook and eat meals together with your family, when possible.  · Drink enough fluid to keep your urine clear or pale  yellow.  · Be physically active every day. This includes:  ? Aerobic exercise like running or swimming.  ? Leisure activities like gardening, walking, or housework.  · Get 7-8 hours of sleep each night.  · If recommended by your health care provider, drink red wine in moderation. This means 1 glass a day for nonpregnant women and 2 glasses a day for men. A glass of wine equals 5 oz (150 mL).  Reading food labels    · Check the serving size of packaged foods. For foods such as rice and pasta, the serving size refers to the amount of cooked product, not dry.  · Check the total fat in packaged foods. Avoid foods that have saturated fat or trans fats.  · Check the ingredients list for added sugars, such as corn syrup.  Shopping  · At the grocery store, buy most of your food from the areas near the walls of the store. This includes:  ? Fresh fruits and vegetables (produce).  ? Grains, beans, nuts, and seeds. Some of these may be available in unpackaged forms or large amounts (in bulk).  ? Fresh seafood.  ? Poultry and eggs.  ? Low-fat dairy products.  · Buy whole ingredients instead of prepackaged foods.  · Buy fresh fruits and vegetables in-season from local [employer] markets.  · Buy frozen fruits and vegetables in resealable bags.  · If you do not have access to quality fresh seafood, buy precooked frozen shrimp or canned fish, such as tuna, salmon, or sardines.  · Buy small amounts of raw or cooked vegetables, salads, or olives from the deli or salad bar at your store.  · Stock your pantry so you always have certain foods on hand, such as olive oil, canned tuna, canned tomatoes, rice, pasta, and beans.  Cooking  · Cook foods with extra-virgin olive oil instead of using butter or other vegetable oils.  · Have meat as a side dish, and have vegetables or grains as your main dish. This means having meat in small portions or adding small amounts of meat to foods like pasta or stew.  · Use beans or vegetables instead of meat  in common dishes like chili or lasagna.  · Zephyr with different cooking methods. Try roasting or broiling vegetables instead of steaming or sautéeing them.  · Add frozen vegetables to soups, stews, pasta, or rice.  · Add nuts or seeds for added healthy fat at each meal. You can add these to yogurt, salads, or vegetable dishes.  · Marinate fish or vegetables using olive oil, lemon juice, garlic, and fresh herbs.  Meal planning    · Plan to eat 1 vegetarian meal one day each week. Try to work up to 2 vegetarian meals, if possible.  · Eat seafood 2 or more times a week.  · Have healthy snacks readily available, such as:  ? Vegetable sticks with hummus.  ? Greek yogurt.  ? Fruit and nut trail mix.  · Eat balanced meals throughout the week. This includes:  ? Fruit: 2-3 servings a day  ? Vegetables: 4-5 servings a day  ? Low-fat dairy: 2 servings a day  ? Fish, poultry, or lean meat: 1 serving a day  ? Beans and legumes: 2 or more servings a week  ? Nuts and seeds: 1-2 servings a day  ? Whole grains: 6-8 servings a day  ? Extra-virgin olive oil: 3-4 servings a day  · Limit red meat and sweets to only a few servings a month  What are my food choices?  · Mediterranean diet  ? Recommended  § Grains: Whole-grain pasta. Brown rice. Bulgar wheat. Polenta. Couscous. Whole-wheat bread. Oatmeal. Quinoa.  § Vegetables: Artichokes. Beets. Broccoli. Cabbage. Carrots. Eggplant. Green beans. Chard. Kale. Spinach. Onions. Leeks. Peas. Squash. Tomatoes. Peppers. Radishes.  § Fruits: Apples. Apricots. Avocado. Berries. Bananas. Cherries. Dates. Figs. Grapes. Yonas. Melon. Oranges. Peaches. Plums. Pomegranate.  § Meats and other protein foods: Beans. Almonds. Sunflower seeds. Pine nuts. Peanuts. Cod. [place]. Scallops. Shrimp. Tuna. Tilapia. Clams. Oysters. Eggs.  § Dairy: Low-fat milk. Cheese. Greek yogurt.  § Beverages: Water. Red wine. Herbal tea.  § Fats and oils: Extra virgin olive oil. Avocado oil. Grape seed oil.  § Sweets and  desserts: Greek yogurt with honey. Baked apples. Poached pears. Trail mix.  § Seasoning and other foods: Basil. Cilantro. Coriander. Cumin. Mint. Parsley. Kike. Rosemary. Tarragon. Garlic. Oregano. Thyme. Pepper. Balsalmic vinegar. Tahini. Hummus. Tomato sauce. Olives. Mushrooms.  ? Limit these  § Grains: Prepackaged pasta or rice dishes. Prepackaged cereal with added sugar.  § Vegetables: Deep fried potatoes (french fries).  § Fruits: Fruit canned in syrup.  § Meats and other protein foods: Beef. Pork. Lamb. Poultry with skin. Hot dogs. Mcdonough.  § Dairy: Ice cream. Sour cream. Whole milk.  § Beverages: Juice. Sugar-sweetened soft drinks. Beer. Liquor and spirits.  § Fats and oils: Butter. Canola oil. Vegetable oil. Beef fat (tallow). Lard.  § Sweets and desserts: Cookies. Cakes. Pies. Candy.  § Seasoning and other foods: Mayonnaise. Premade sauces and marinades.  The items listed may not be a complete list. Talk with your dietitian about what dietary choices are right for you.  Summary  · The Mediterranean diet includes both food and lifestyle choices.  · Eat a variety of fresh fruits and vegetables, beans, nuts, seeds, and whole grains.  · Limit the amount of red meat and sweets that you eat.  · Talk with your health care provider about whether it is safe for you to drink red wine in moderation. This means 1 glass a day for nonpregnant women and 2 glasses a day for men. A glass of wine equals 5 oz (150 mL).  This information is not intended to replace advice given to you by your health care provider. Make sure you discuss any questions you have with your health care provider.  Document Revised: 08/17/2017 Document Reviewed: 08/10/2017  Elsevier Patient Education © 2020 [employer]  Palpitations  Palpitations are feelings that your heartbeat is irregular or is faster than normal. It may feel like your heart is fluttering or skipping a beat. Palpitations are usually not a serious problem. They may be caused by many  things, including smoking, caffeine, alcohol, stress, and certain medicines or drugs. Most causes of palpitations are not serious. However, some palpitations can be a sign of a serious problem. You may need further tests to rule out serious medical problems.  Follow these instructions at home:         Pay attention to any changes in your condition. Take these actions to help manage your symptoms:  Eating and drinking  · Avoid foods and drinks that may cause palpitations. These may include:  ? Caffeinated coffee, tea, soft drinks, diet pills, and energy drinks.  ? Chocolate.  ? Alcohol.  Lifestyle  · Take steps to reduce your stress and anxiety. Things that can help you relax include:  ? Yoga.  ? Mind-body activities, such as deep breathing, meditation, or using words and images to create positive thoughts (guided imagery).  ? Physical activity, such as swimming, jogging, or walking. Tell your health care provider if your palpitations increase with activity. If you have chest pain or shortness of breath with activity, do not continue the activity until you are seen by your health care provider.  ? Biofeedback. This is a method that helps you learn to use your mind to control things in your body, such as your heartbeat.  · Do not use drugs, including cocaine or ecstasy. Do not use marijuana.  · Get plenty of rest and sleep. Keep a regular bed time.  General instructions  · Take over-the-counter and prescription medicines only as told by your health care provider.  · Do not use any products that contain nicotine or tobacco, such as cigarettes and e-cigarettes. If you need help quitting, ask your health care provider.  · Keep all follow-up visits as told by your health care provider. This is important. These may include visits for further testing if palpitations do not go away or get worse.  Contact a health care provider if you:  · Continue to have a fast or irregular heartbeat after 24 hours.  · Notice that your  palpitations occur more often.  Get help right away if you:  · Have chest pain or shortness of breath.  · Have a severe headache.  · Feel dizzy or you faint.  Summary  · Palpitations are feelings that your heartbeat is irregular or is faster than normal. It may feel like your heart is fluttering or skipping a beat.  · Palpitations may be caused by many things, including smoking, caffeine, alcohol, stress, certain medicines, and drugs.  · Although most causes of palpitations are not serious, some causes can be a sign of a serious medical problem.  · Get help right away if you faint or have chest pain, shortness of breath, a severe headache, or dizziness.  This information is not intended to replace advice given to you by your health care provider. Make sure you discuss any questions you have with your health care provider.  Document Revised: 01/30/2019 Document Reviewed: 01/30/2019  Elsevier Patient Education © 2020 [employer]

## 2020-12-09 NOTE — PROGRESS NOTES
Chief Complaint   Patient presents with   • Follow-up     Cardiac management .Has no cardiac complaints today.   • Labs     Had labs per PCP , will obtain results   • Med Refill     Needs refills on nadolol 30 day supply to [facility] pharmacy.       Subjective       Tierney Gupta is a 45 y.o. female  with hypothyroidism, HTN, palpitations, sleep apnea, and significant metabolic syndrome.  She was seen in the past for palpitation.  Workup at that time showed normal LV systolic function, mild pulmonary hypertension and sinus tachycardia.  She was not able to tolerate Toprol and so Corgard was started. Initially she tolerated well, but then referred back because she had increasing palpitations and noticed that Corgard was not helping. She had been having increasing shortness of breath. Holter and echo were advised.  Holter showed baseline NSR with highest heart rate at 151. Nadolol changed to Cardizem.  Echo showed normal LV function and no valvular concerns. She then felt like Cardizem was causing side effects, allergy like symptoms and it was stopped. She went back on nadolol with dose titrated and she had symptom control.     Today she comes to the office for a follow-up visit.  She denies recent palpitations and is compliant with nadolol 3 times daily.  No recent cardiac symptoms or concerns voiced.  She is maintaining her normal daily activities.  She has been trying to maintain a healthier diet and has lost a little over 10 pounds this past year.    Cardiac History:    Past Surgical History:   Procedure Laterality Date   • CARDIOVASCULAR STRESS TEST  08/28/2008    R. Stress- 6 Min, 30 Secs. 84% THR. Negative   • CONVERTED (HISTORICAL) HOLTER  07/03/2008    AVG HR 82 BPM. Max-140 BPM.   • CONVERTED (HISTORICAL) HOLTER  07/10/2018    Baseline NSR with some sinus tach high 151, nadolol d/c and cardizem started   • ECHO - CONVERTED  06/30/2008    EF >60%. RVSP 33 mmHg.   • ECHO - CONVERTED  06/28/2018    EF 65%. RVSP- 16  mmHg.   • ECHO - CONVERTED  2018    EF 60-65%, mild MR, no AS, diastolic dysfunction, RSVP 16mmHg       Current Outpatient Medications   Medication Sig Dispense Refill   • albuterol (PROVENTIL HFA;VENTOLIN HFA) 108 (90 Base) MCG/ACT inhaler Inhale 2 puffs Every 4 (Four) Hours As Needed for Wheezing.     • Cholecalciferol (VITAMIN D3) 2000 units tablet Take  by mouth Daily.     • levothyroxine (SYNTHROID, LEVOTHROID) 125 MCG tablet Take 112 mcg by mouth Daily.     • nadolol (CORGARD) 20 MG tablet Three times daily 90 tablet 12   • sertraline (ZOLOFT) 100 MG tablet Take 100 mg by mouth Daily.       No current facility-administered medications for this visit.        Diphenhydramine and Aspirin    Past Medical History:   Diagnosis Date   • Dilatation of esophagus    • H/O hernia repair    • H/O total hysterectomy    • H/O:      x2   • History of carpal tunnel release    • History of cholecystectomy    • Hyperlipidemia    • Hypothyroidism    • Palpitations    • Sleep apnea     uses CPAP   • Tachycardia        Social History     Socioeconomic History   • Marital status: Single     Spouse name: Not on file   • Number of children: Not on file   • Years of education: Not on file   • Highest education level: Not on file   Tobacco Use   • Smoking status: Former Smoker     Quit date:      Years since quittin.9   • Smokeless tobacco: Never Used   • Tobacco comment: recently quit smoking, currently wearing patch    Substance and Sexual Activity   • Alcohol use: No   • Drug use: No       Family History   Problem Relation Age of Onset   • Heart disease Mother    • Hypertension Mother    • Hyperlipidemia Mother    • Heart attack Father    • No Known Problems Brother        Review of Systems   Constitution: Negative for decreased appetite, diaphoresis and malaise/fatigue.   HENT: Negative for nosebleeds.    Eyes: Negative for blurred vision.   Cardiovascular: Negative for chest pain, claudication, cyanosis,  "dyspnea on exertion, irregular heartbeat, leg swelling, near-syncope, orthopnea, palpitations, paroxysmal nocturnal dyspnea and syncope.   Respiratory: Positive for sleep disturbances due to breathing (unable to use CPAP for more than few hours. recently has not been using). Negative for shortness of breath.    Endocrine: Negative for cold intolerance and heat intolerance.   Hematologic/Lymphatic: Does not bruise/bleed easily.   Skin: Negative for rash.   Musculoskeletal: Negative for myalgias.   Gastrointestinal: Positive for diarrhea. Negative for heartburn, melena and nausea.        Follows with gastro   Genitourinary: Negative for dysuria and hematuria.   Neurological: Negative for dizziness and light-headedness.   Psychiatric/Behavioral: The patient does not have insomnia and is not nervous/anxious.         BP Readings from Last 5 Encounters:   12/09/20 122/70   05/20/20 130/82   10/15/19 110/70   04/11/19 112/76   10/11/18 128/80       Wt Readings from Last 5 Encounters:   12/09/20 107 kg (236 lb)   05/20/20 108 kg (238 lb)   10/15/19 111 kg (243 lb 12.8 oz)   04/11/19 113 kg (249 lb)   10/11/18 112 kg (246 lb)         Objective     /70 (BP Location: Left arm)   Pulse 62   Temp 98.4 °F (36.9 °C)   Ht 167.6 cm (65.98\")   Wt 107 kg (236 lb)   BMI 38.11 kg/m²     Vitals signs and nursing note reviewed.   Eyes:      Pupils: Pupils are equal, round, and reactive to light.   HENT:      Head: Normocephalic.   Neck:      Musculoskeletal: Normal range of motion.      Vascular: No carotid bruit.   Pulmonary:      Breath sounds: Normal breath sounds.   Cardiovascular:      Normal rate. Regular rhythm.   Edema:     Peripheral edema absent.   Abdominal:      General: Bowel sounds are normal.      Palpations: Abdomen is soft.   Musculoskeletal: Normal range of motion.   Skin:     General: Skin is warm.   Neurological:      Mental Status: Alert and oriented to person, place, and time.        Procedures: none today "            Assessment/Plan     Diagnoses and all orders for this visit:    1. Essential hypertension (Primary)  -     nadolol (CORGARD) 20 MG tablet; Three times daily  Dispense: 90 tablet; Refill: 12    2. Hypercholesteremia    3. Palpitations  -     nadolol (CORGARD) 20 MG tablet; Three times daily  Dispense: 90 tablet; Refill: 12    4. Sleep apnea in adult    5. Metabolic syndrome      Hypertension/palpitations -blood pressure normal today.  Palpitations remain controlled.  Refills given to continue nadolol 20 mg 3 times daily.    Hypercholesterolemia-currently diet managed.  She will follow with you for lab orders/management.  Please forward copy of next lab results?    Hypothyroidism-according to patient note recent change in dosages made.  She continues levothyroxine 125 mg daily.    Patient's Body mass index is 38.11 kg/m². BMI is above normal parameters. Recommendations include: nutrition counseling.  I complemented her on maintaining weight loss over the last year.  I encouraged her to continue diet efforts with goal BMI of less than 30.     Tierney Gupta  reports that she quit smoking about 12 years ago. She has never used smokeless tobacco.  I complemented her on maintaining smoking cessation.    We will see her back in 1 year.  Please call sooner for cardiac concerns.            Electronically signed by DANNY Fairbanks,  December 9, 2020 14:52 EST

## 2021-03-05 ENCOUNTER — TELEPHONE (OUTPATIENT)
Dept: CARDIOLOGY | Facility: CLINIC | Age: 46
End: 2021-03-05

## 2021-03-05 DIAGNOSIS — Z79.899 MEDICATION MANAGEMENT: ICD-10-CM

## 2021-03-05 DIAGNOSIS — I73.9 PVD (PERIPHERAL VASCULAR DISEASE) WITH CLAUDICATION (HCC): Primary | ICD-10-CM

## 2021-03-05 NOTE — TELEPHONE ENCOUNTER
"Received referral from PCP to eval and treat for PVD lower extremity.   I spoke with patient and she said she does have redness, pain and at times hot \"streaks \" which is worse at night, this is  In  Both knees down to feet .  She had office visit   "

## 2021-03-15 NOTE — TELEPHONE ENCOUNTER
Patient aware of scheduled appointment at [facility]-  for CTA/Abdomen and to arrive early for labs . She verbalized understanding

## 2021-03-29 ENCOUNTER — TELEPHONE (OUTPATIENT)
Dept: CARDIOLOGY | Facility: CLINIC | Age: 46
End: 2021-03-29

## 2021-03-29 NOTE — TELEPHONE ENCOUNTER
Disregard, you do not need to put in TABATHA order.  I called patient to let her know CTA cancelled and scheduling TABATHA.  She states she is already scheduled for TABATHA by her PCP on 4/8/21.

## 2021-03-29 NOTE — TELEPHONE ENCOUNTER
[facility] denied CTA abdominal aorta bilateral with run off.  There letter states patient must have abnormal TABATHA first.

## 2021-04-28 ENCOUNTER — OFFICE VISIT (OUTPATIENT)
Dept: CARDIOLOGY | Facility: CLINIC | Age: 46
End: 2021-04-28

## 2021-04-28 VITALS
HEART RATE: 76 BPM | SYSTOLIC BLOOD PRESSURE: 110 MMHG | BODY MASS INDEX: 35.52 KG/M2 | DIASTOLIC BLOOD PRESSURE: 64 MMHG | TEMPERATURE: 98 F | HEIGHT: 66 IN | WEIGHT: 221 LBS

## 2021-04-28 DIAGNOSIS — G47.30 SLEEP APNEA IN ADULT: ICD-10-CM

## 2021-04-28 DIAGNOSIS — E78.00 HYPERCHOLESTEREMIA: ICD-10-CM

## 2021-04-28 DIAGNOSIS — E03.9 ACQUIRED HYPOTHYROIDISM: ICD-10-CM

## 2021-04-28 DIAGNOSIS — I10 ESSENTIAL HYPERTENSION: ICD-10-CM

## 2021-04-28 DIAGNOSIS — E88.81 METABOLIC SYNDROME: ICD-10-CM

## 2021-04-28 DIAGNOSIS — R06.02 SHORTNESS OF BREATH: ICD-10-CM

## 2021-04-28 DIAGNOSIS — R00.2 PALPITATIONS: Primary | ICD-10-CM

## 2021-04-28 PROCEDURE — 99214 OFFICE O/P EST MOD 30 MIN: CPT | Performed by: NURSE PRACTITIONER

## 2021-04-28 RX ORDER — CETIRIZINE HYDROCHLORIDE 10 MG/1
10 TABLET ORAL DAILY
COMMUNITY
End: 2021-12-09 | Stop reason: SINTOL

## 2021-04-28 RX ORDER — MONTELUKAST SODIUM 10 MG/1
10 TABLET ORAL NIGHTLY
COMMUNITY

## 2021-04-28 RX ORDER — OMEPRAZOLE 20 MG/1
20 CAPSULE, DELAYED RELEASE ORAL DAILY
COMMUNITY

## 2021-04-28 NOTE — PATIENT INSTRUCTIONS
Mediterranean Diet  A Mediterranean diet refers to food and lifestyle choices that are based on the traditions of countries located on the [place]. This way of eating has been shown to help prevent certain conditions and improve outcomes for people who have chronic diseases, like kidney disease and heart disease.  What are tips for following this plan?  Lifestyle  · Cook and eat meals together with your family, when possible.  · Drink enough fluid to keep your urine clear or pale yellow.  · Be physically active every day. This includes:  ? Aerobic exercise like running or swimming.  ? Leisure activities like gardening, walking, or housework.  · Get 7-8 hours of sleep each night.  · If recommended by your health care provider, drink red wine in moderation. This means 1 glass a day for nonpregnant women and 2 glasses a day for men. A glass of wine equals 5 oz (150 mL).  Reading food labels    · Check the serving size of packaged foods. For foods such as rice and pasta, the serving size refers to the amount of cooked product, not dry.  · Check the total fat in packaged foods. Avoid foods that have saturated fat or trans fats.  · Check the ingredients list for added sugars, such as corn syrup.  Shopping  · At the grocery store, buy most of your food from the areas near the walls of the store. This includes:  ? Fresh fruits and vegetables (produce).  ? Grains, beans, nuts, and seeds. Some of these may be available in unpackaged forms or large amounts (in bulk).  ? Fresh seafood.  ? Poultry and eggs.  ? Low-fat dairy products.  · Buy whole ingredients instead of prepackaged foods.  · Buy fresh fruits and vegetables in-season from local farmers markets.  · Buy frozen fruits and vegetables in resealable bags.  · If you do not have access to quality fresh seafood, buy precooked frozen shrimp or canned fish, such as tuna, salmon, or sardines.  · Buy small amounts of raw or cooked vegetables, salads, or olives from  the deli or salad bar at your store.  · Stock your pantry so you always have certain foods on hand, such as olive oil, canned tuna, canned tomatoes, rice, pasta, and beans.  Cooking  · Cook foods with extra-virgin olive oil instead of using butter or other vegetable oils.  · Have meat as a side dish, and have vegetables or grains as your main dish. This means having meat in small portions or adding small amounts of meat to foods like pasta or stew.  · Use beans or vegetables instead of meat in common dishes like chili or lasagna.  · [place] with different cooking methods. Try roasting or broiling vegetables instead of steaming or sautéeing them.  · Add frozen vegetables to soups, stews, pasta, or rice.  · Add nuts or seeds for added healthy fat at each meal. You can add these to yogurt, salads, or vegetable dishes.  · Marinate fish or vegetables using olive oil, lemon juice, garlic, and fresh herbs.  Meal planning    · Plan to eat 1 vegetarian meal one day each week. Try to work up to 2 vegetarian meals, if possible.  · Eat seafood 2 or more times a week.  · Have healthy snacks readily available, such as:  ? Vegetable sticks with hummus.  ? Greek yogurt.  ? Fruit and nut trail mix.  · Eat balanced meals throughout the week. This includes:  ? Fruit: 2-3 servings a day  ? Vegetables: 4-5 servings a day  ? Low-fat dairy: 2 servings a day  ? Fish, poultry, or lean meat: 1 serving a day  ? Beans and legumes: 2 or more servings a week  ? Nuts and seeds: 1-2 servings a day  ? Whole grains: 6-8 servings a day  ? Extra-virgin olive oil: 3-4 servings a day  · Limit red meat and sweets to only a few servings a month  What are my food choices?  · Mediterranean diet  ? Recommended  § Grains: Whole-grain pasta. Brown rice. Bulgar wheat. Polenta. Couscous. Whole-wheat bread. Oatmeal. Quinoa.  § Vegetables: Artichokes. Beets. Broccoli. Cabbage. Carrots. Eggplant. Green beans. Chard. Kale. Spinach. Onions. Leeks. Peas. Squash.  Tomatoes. Peppers. Radishes.  § Fruits: Apples. Apricots. Avocado. Berries. Bananas. Cherries. Dates. Figs. Grapes. Yonas. Melon. Oranges. Peaches. Plums. Pomegranate.  § Meats and other protein foods: Beans. Almonds. Sunflower seeds. Pine nuts. Peanuts. Cod. Bailey. Scallops. Shrimp. Tuna. Tilapia. Clams. Oysters. Eggs.  § Dairy: Low-fat milk. Cheese. Greek yogurt.  § Beverages: Water. Red wine. Herbal tea.  § Fats and oils: Extra virgin olive oil. Avocado oil. Grape seed oil.  § Sweets and desserts: Greek yogurt with honey. Baked apples. Poached pears. Trail mix.  § Seasoning and other foods: Basil. Cilantro. Coriander. Cumin. Mint. Parsley. Kike. Rosemary. Tarragon. Garlic. Oregano. Thyme. Pepper. Balsalmic vinegar. Tahini. Hummus. Tomato sauce. Olives. Mushrooms.  ? Limit these  § Grains: Prepackaged pasta or rice dishes. Prepackaged cereal with added sugar.  § Vegetables: Deep fried potatoes (french fries).  § Fruits: Fruit canned in syrup.  § Meats and other protein foods: Beef. Pork. Lamb. Poultry with skin. Hot dogs. Mcdonough.  § Dairy: Ice cream. Sour cream. Whole milk.  § Beverages: Juice. Sugar-sweetened soft drinks. Beer. Liquor and spirits.  § Fats and oils: Butter. Canola oil. Vegetable oil. Beef fat (tallow). Lard.  § Sweets and desserts: Cookies. Cakes. Pies. Candy.  § Seasoning and other foods: Mayonnaise. Premade sauces and marinades.  The items listed may not be a complete list. Talk with your dietitian about what dietary choices are right for you.  Summary  · The Mediterranean diet includes both food and lifestyle choices.  · Eat a variety of fresh fruits and vegetables, beans, nuts, seeds, and whole grains.  · Limit the amount of red meat and sweets that you eat.  · Talk with your health care provider about whether it is safe for you to drink red wine in moderation. This means 1 glass a day for nonpregnant women and 2 glasses a day for men. A glass of wine equals 5 oz (150 mL).  This information  is not intended to replace advice given to you by your health care provider. Make sure you discuss any questions you have with your health care provider.  Document Revised: 08/17/2017 Document Reviewed: 08/10/2017  Elsevier Patient Education © 2020 [employer]

## 2021-04-28 NOTE — PROGRESS NOTES
"Chief Complaint   Patient presents with   • Follow-up     Cardiac management   • Lab     Last labs in chart.   • Pain     Continues to have shooting pains in bottom of feet and legs, pains comes and goes. Subsides with massage.   • Shortness of Breath     States \"I am always short of breath\". Wears CPAP at night, follows with Dr Sahu.   • Blood pressure     Doing much better.   • Weight loss     She has stopped drinking Mt Dew and stopped eating breads.     Subjective       Tierney Gupta is a 46 y.o. female with hypothyroidism, mild HTN, palpitations, sleep apnea, and metabolic syndrome.  She was seen in 2008 for palpitation found to have mild PHTN and sinus tachycardia. She did not tolerate Toprol, so Corgard was started. Initially she tolerated well, but then referred back because she had increasing palpitations and noticed that Corgard was not helping. She also had increasing shortness of breath. Holter and echo were advised.  Holter showed baseline NSR with highest heart rate at 151. Nadolol changed to Cardizem.  Echo showed normal LV function and no valvular concerns. She then felt like Cardizem was causing side effects, allergy like symptoms and it was stopped. She went back on nadolol with dose titrated to TID with good symptom control. Our office was contacted in March 2021 for LE pain. CTA LE recommended but insurance denied without first having TABATHA or US.     She returns today for follow up. She continues to watch her diet, avoiding sugar/bread, and has lost 17 pounds over the last year. Palpitations remain well controlled. She attempts to wear CPAP every night but does have some trouble with the mask. She denies chest pain, dizziness or syncope. She continues to have left leg numbness and shooting pain in her lower leg and foot. She notes this pain is worse with prolonged sitting. No claudication pain. According to her, US or TABATHA done with PCP showed no stenosis. She has positive DP bilaterally. Labs " 21 normal CBC, sed rate 15, CRP 2, TSH 0.347, T4 1.26, , , HDL 36, , RA profile negative, A1c 5.9%, vitamin D 33.         Cardiac History:    Past Surgical History:   Procedure Laterality Date   • CARDIOVASCULAR STRESS TEST  2008    R. Stress- 6 Min, 30 Secs. 84% THR. Negative   • CONVERTED (HISTORICAL) HOLTER  2008    AVG HR 82 BPM. Max-140 BPM.   • CONVERTED (HISTORICAL) HOLTER  07/10/2018    Baseline NSR with some sinus tach high 151, nadolol d/c and cardizem started   • ECHO - CONVERTED  2008    EF >60%. RVSP 33 mmHg.   • ECHO - CONVERTED  2018    EF 65%. RVSP- 16 mmHg.   • ECHO - CONVERTED  2018    EF 60-65%, mild MR, no AS, diastolic dysfunction, RSVP 16mmHg     Current Outpatient Medications   Medication Sig Dispense Refill   • albuterol (PROVENTIL HFA;VENTOLIN HFA) 108 (90 Base) MCG/ACT inhaler Inhale 2 puffs Every 4 (Four) Hours As Needed for Wheezing.     • cetirizine (zyrTEC) 10 MG tablet Take 10 mg by mouth Daily.     • Cholecalciferol (VITAMIN D3) 2000 units tablet Take  by mouth Daily.     • fluticasone-salmeterol (Wixela Inhub) 250-50 MCG/DOSE DISKUS Inhale 2 (Two) Times a Day.     • levothyroxine (SYNTHROID, LEVOTHROID) 125 MCG tablet Take 125 mcg by mouth Daily.     • montelukast (SINGULAIR) 10 MG tablet Take 10 mg by mouth Every Night.     • nadolol (CORGARD) 20 MG tablet Three times daily 90 tablet 12   • omeprazole (priLOSEC) 20 MG capsule Take 20 mg by mouth Daily.     • sertraline (ZOLOFT) 100 MG tablet Take 100 mg by mouth Daily.       No current facility-administered medications for this visit.     Diphenhydramine, Aspirin, and Penicillins    Past Medical History:   Diagnosis Date   • Dilatation of esophagus    • H/O hernia repair    • H/O total hysterectomy    • H/O:      x2   • History of carpal tunnel release    • History of cholecystectomy    • Hyperlipidemia    • Hypothyroidism    • Palpitations    • Sleep apnea     uses  "CPAP   • Tachycardia      Social History     Socioeconomic History   • Marital status: Single     Spouse name: Not on file   • Number of children: Not on file   • Years of education: Not on file   • Highest education level: Not on file   Tobacco Use   • Smoking status: Former Smoker     Quit date:      Years since quittin.3   • Smokeless tobacco: Never Used   • Tobacco comment: recently quit smoking, currently wearing patch    Vaping Use   • Vaping Use: Never used   Substance and Sexual Activity   • Alcohol use: Yes     Comment: occasionally   • Drug use: No     Family History   Problem Relation Age of Onset   • Heart disease Mother    • Hypertension Mother    • Hyperlipidemia Mother    • Heart attack Father    • No Known Problems Brother      Review of Systems   Constitutional: Positive for weight loss (Down 15 pounds in the last 6 months). Negative for decreased appetite and malaise/fatigue.   HENT: Negative.    Eyes: Negative for blurred vision.   Cardiovascular: Negative for chest pain, dyspnea on exertion, leg swelling, palpitations (Well-controlled) and syncope.   Respiratory: Positive for shortness of breath and sleep disturbances due to breathing.    Endocrine: Negative.    Hematologic/Lymphatic: Negative for bleeding problem. Does not bruise/bleed easily.   Skin: Negative.    Musculoskeletal: Positive for back pain (With radiculopathy). Negative for falls and myalgias.   Gastrointestinal: Negative for abdominal pain, heartburn and melena.   Genitourinary: Negative for hematuria.   Neurological: Positive for dizziness (After getting Covid vaccine, improved now). Negative for light-headedness.   Psychiatric/Behavioral: Negative for altered mental status.   Allergic/Immunologic: Negative.       Objective     /64 (BP Location: Right arm)   Pulse 76   Temp 98 °F (36.7 °C)   Ht 167.6 cm (65.98\")   Wt 100 kg (221 lb)   BMI 35.69 kg/m²     Vitals and nursing note reviewed.   Constitutional:      "  General: Not in acute distress.     Appearance: Well-developed. Not diaphoretic.   Eyes:      Pupils: Pupils are equal, round, and reactive to light.   HENT:      Head: Normocephalic.   Pulmonary:      Effort: Pulmonary effort is normal. No respiratory distress.      Breath sounds: Normal breath sounds.   Cardiovascular:      Normal rate. Regular rhythm.   Pulses:     Intact distal pulses.   Edema:     Peripheral edema absent.   Abdominal:      General: Bowel sounds are normal.      Palpations: Abdomen is soft.   Musculoskeletal: Normal range of motion.      Cervical back: Normal range of motion. Skin:     General: Skin is warm and dry.   Neurological:      Mental Status: Alert and oriented to person, place, and time.        Procedures          Problem List Items Addressed This Visit        Cardiac and Vasculature    Palpitations - Primary    Hypercholesteremia    Essential hypertension       Endocrine and Metabolic    Metabolic syndrome    Hypothyroidism       Pulmonary and Pneumonias    Shortness of breath       Sleep    Sleep apnea in adult         1.  Palpitations-diagnosed to sinus tachycardia in the past, well controlled with Corgard 3 times daily.  Heart rate and blood pressure are normal today.  Continue the same.  Limit caffeine.    2.  HTN-well controlled with beta-blocker.  Continue weight loss efforts.    3.  DEVIN-managed with CPAP, follows with Dr. Martinez.    4.  Hypercholesterolemia-we reviewed her labs at length.  Lipids, triglycerides, LDL are elevated.  HDL is low at 32.  10-year ASCVD risk calculated at 1.3%.  She is encouraged to continue strict diet, reducing carbohydrates, sugar and body weight.  I explained to her about metabolic syndrome and prediabetes.  She is doing fantastic with her diet.  Advised to continue efforts.  I did not add any medication at this time.  Mediterranean diet sheet given.    5.  Leg pain-worse on the right associated with numbness, tingling, shooting pain.  She has  positive pedal pulses, denies claudication pain.  According to her, TABATHA or ultrasound showed no arterial stenosis.  Appears to be radiculopathy/neuropathy.  She does not wish to participate in PT.  Advised to stretch regularly, avoid prolonged sitting or standing.  Change positions frequently.    She appears stable from a cardiac standpoint.  No changes made today.  Thank you for forwarding her labs.  We will see her back in 6 months or sooner if needed.    Patient's Body mass index is 35.69 kg/m². BMI is above normal parameters. Recommendations include: nutrition counseling.               Electronically signed by DANNY Mchugh,  April 28, 2021 10:01 EDT

## 2021-12-09 ENCOUNTER — OFFICE VISIT (OUTPATIENT)
Dept: CARDIOLOGY | Facility: CLINIC | Age: 46
End: 2021-12-09

## 2021-12-09 VITALS
TEMPERATURE: 98.2 F | BODY MASS INDEX: 36.77 KG/M2 | HEART RATE: 64 BPM | WEIGHT: 228.8 LBS | DIASTOLIC BLOOD PRESSURE: 74 MMHG | HEIGHT: 66 IN | SYSTOLIC BLOOD PRESSURE: 110 MMHG

## 2021-12-09 DIAGNOSIS — E78.00 HYPERCHOLESTEREMIA: Primary | ICD-10-CM

## 2021-12-09 DIAGNOSIS — R06.02 SHORTNESS OF BREATH: ICD-10-CM

## 2021-12-09 DIAGNOSIS — I10 ESSENTIAL HYPERTENSION: ICD-10-CM

## 2021-12-09 DIAGNOSIS — R00.2 PALPITATIONS: ICD-10-CM

## 2021-12-09 DIAGNOSIS — R07.89 CHEST PAIN, ATYPICAL: ICD-10-CM

## 2021-12-09 PROCEDURE — 99214 OFFICE O/P EST MOD 30 MIN: CPT | Performed by: NURSE PRACTITIONER

## 2021-12-09 RX ORDER — FLUTICASONE PROPIONATE 50 MCG
2 SPRAY, SUSPENSION (ML) NASAL DAILY
COMMUNITY

## 2021-12-09 RX ORDER — TRIAMCINOLONE ACETONIDE 1 MG/G
1 CREAM TOPICAL 2 TIMES DAILY
COMMUNITY

## 2021-12-09 RX ORDER — NADOLOL 20 MG/1
TABLET ORAL
Qty: 270 TABLET | Refills: 4 | Status: SHIPPED | OUTPATIENT
Start: 2021-12-09

## 2021-12-09 RX ORDER — LEVOCETIRIZINE DIHYDROCHLORIDE 5 MG/1
5 TABLET, FILM COATED ORAL EVERY EVENING
COMMUNITY

## 2021-12-09 RX ORDER — IPRATROPIUM BROMIDE AND ALBUTEROL SULFATE 2.5; .5 MG/3ML; MG/3ML
3 SOLUTION RESPIRATORY (INHALATION) EVERY 4 HOURS PRN
COMMUNITY

## 2021-12-09 RX ORDER — MULTIVIT WITH MINERALS/LUTEIN
1000 TABLET ORAL DAILY
COMMUNITY

## 2021-12-09 RX ORDER — KETOCONAZOLE 20 MG/ML
SHAMPOO TOPICAL DAILY PRN
COMMUNITY

## 2021-12-09 NOTE — PROGRESS NOTES
Chief Complaint   Patient presents with   • Follow-up     Cardiac management   • Lab     Last labs couple weeks ago per PCP. She reports having renal cyst, to have CT of ABD and pelvis next week.   • Shortness of Breath     Same as before. She reports that she has CPAP yet does not wear.   • Palpitations     Only has occasional, notices if she forgets medication.   • Chest Pain     Has occasional pressure in mid to right chest, short in duration. She reports that she notices about every other day.   • Med Refill     Needs refills on Nadolol-90 day.     Subjective       Tierney Gupta is a 46 y.o. female with hypothyroidism, mild HTN, palpitations, sleep apnea, and metabolic syndrome.  She was seen in 2008 for palpitation found to have mild PHTN and sinus tachycardia. She did not tolerate Toprol, so Corgard was started. Initially she tolerated well, but then referred back because she had increasing palpitations and noticed that Corgard was not helping. She also had increasing shortness of breath. Holter and echo were advised.  Holter showed baseline NSR with highest heart rate at 151. Nadolol changed to Cardizem.  Echo showed normal LV function and no valvular concerns. She then felt like Cardizem was causing side effects, allergy like symptoms and it was stopped. She went back on nadolol with dose titrated to TID with good symptom control. Our office was contacted in March 2021 for LE pain. CTA LE recommended but insurance denied without first having TABATHA or US. She completed this with PCP office reported as normal. DEVIN was diagnosed but CPAP not tolerated.     She returns today for regular follow up visit. She is planning to have CT abd to evaluate renal cyst. Continues to have dyspnea on exertion which has worsened over the last 6 months. She has midsternal chest pressure radiates to both right and left chest, worse with exertion, relieved with rest. Episodes last 10-15 minutes then subside. She continues to manage   which requires a lot of physical exertion. She does not use nitro. Palpitations well controlled with Corgard. Lipids 2/2021 , HDL 36.        Cardiac History:    Past Surgical History:   Procedure Laterality Date   • CARDIOVASCULAR STRESS TEST  08/28/2008    R. Stress- 6 Min, 30 Secs. 84% THR. Negative   • CONVERTED (HISTORICAL) HOLTER  07/03/2008    AVG HR 82 BPM. Max-140 BPM.   • CONVERTED (HISTORICAL) HOLTER  07/10/2018    Baseline NSR with some sinus tach high 151, nadolol d/c and cardizem started   • ECHO - CONVERTED  06/30/2008    EF >60%. RVSP 33 mmHg.   • ECHO - CONVERTED  06/28/2018    EF 65%. RVSP- 16 mmHg.   • ECHO - CONVERTED  07/12/2018    EF 60-65%, mild MR, no AS, diastolic dysfunction, RSVP 16mmHg     Current Outpatient Medications   Medication Sig Dispense Refill   • albuterol (PROVENTIL HFA;VENTOLIN HFA) 108 (90 Base) MCG/ACT inhaler Inhale 2 puffs Every 4 (Four) Hours As Needed for Wheezing.     • ascorbic acid (VITAMIN C) 1000 MG tablet Take 1,000 mg by mouth Daily.     • Cholecalciferol (VITAMIN D3) 2000 units tablet Take 1,000 Units by mouth 2 (Two) Times a Day.     • Diclofenac Sodium (VOLTAREN) 1 % gel gel Apply 4 g topically to the appropriate area as directed 2 (Two) Times a Day As Needed.     • fluticasone (FLONASE) 50 MCG/ACT nasal spray 2 sprays into the nostril(s) as directed by provider Daily.     • fluticasone-salmeterol (Wixela Inhub) 250-50 MCG/DOSE DISKUS Inhale 2 (Two) Times a Day.     • ipratropium-albuterol (DUO-NEB) 0.5-2.5 mg/3 ml nebulizer Take 3 mL by nebulization Every 4 (Four) Hours As Needed for Wheezing.     • ketoconazole (NIZORAL) 2 % shampoo Apply  topically to the appropriate area as directed Daily As Needed for Dandruff.     • levocetirizine (XYZAL) 5 MG tablet Take 5 mg by mouth Every Evening.     • levothyroxine (SYNTHROID, LEVOTHROID) 125 MCG tablet Take 125 mcg by mouth Daily.     • mometasone-formoterol (DULERA 200) 200-5 MCG/ACT inhaler Inhale 2  puffs 2 (Two) Times a Day.     • montelukast (SINGULAIR) 10 MG tablet Take 10 mg by mouth Every Night.     • nadolol (CORGARD) 20 MG tablet Three times daily 270 tablet 4   • omeprazole (priLOSEC) 20 MG capsule Take 20 mg by mouth Daily.     • sertraline (ZOLOFT) 100 MG tablet Take 100 mg by mouth Daily.     • tiotropium (SPIRIVA) 18 MCG per inhalation capsule Place 1 capsule into inhaler and inhale Daily.     • triamcinolone (KENALOG) 0.1 % cream Apply 1 application topically to the appropriate area as directed 2 (Two) Times a Day.       No current facility-administered medications for this visit.     Diphenhydramine, Aspirin, Atorvastatin, Augmentin [amoxicillin-pot clavulanate], Levaquin [levofloxacin], Penicillins, Zyrtec [cetirizine], and Hydroxyzine    Past Medical History:   Diagnosis Date   • Dilatation of esophagus    • H/O hernia repair    • H/O total hysterectomy    • H/O:      x2   • History of carpal tunnel release    • History of cholecystectomy    • Hyperlipidemia    • Hypothyroidism    • Palpitations    • Sleep apnea     uses CPAP   • Tachycardia      Social History     Socioeconomic History   • Marital status: Single   Tobacco Use   • Smoking status: Former Smoker     Quit date:      Years since quittin.9   • Smokeless tobacco: Never Used   • Tobacco comment: recently quit smoking, currently wearing patch    Vaping Use   • Vaping Use: Never used   Substance and Sexual Activity   • Alcohol use: Not Currently     Comment: occasionally   • Drug use: No     Family History   Problem Relation Age of Onset   • Heart disease Mother    • Hypertension Mother    • Hyperlipidemia Mother    • Heart attack Father    • No Known Problems Brother      Review of Systems   Constitutional: Positive for malaise/fatigue and weight gain (7 lb). Negative for decreased appetite.   HENT: Negative.    Eyes: Negative for blurred vision.   Cardiovascular: Positive for chest pain and dyspnea on exertion.  "Negative for leg swelling, palpitations and syncope.   Respiratory: Positive for shortness of breath and sleep disturbances due to breathing (does not tolerate CPAP ).    Endocrine: Negative.    Hematologic/Lymphatic: Negative for bleeding problem. Does not bruise/bleed easily.   Skin: Negative.    Musculoskeletal: Negative for falls and myalgias.   Gastrointestinal: Negative for abdominal pain, heartburn and melena.   Genitourinary: Negative for hematuria.   Neurological: Negative for dizziness and light-headedness.   Psychiatric/Behavioral: Negative for altered mental status.   Allergic/Immunologic: Negative.       Objective     /74 (BP Location: Left arm)   Pulse 64   Temp 98.2 °F (36.8 °C)   Ht 167.6 cm (65.98\")   Wt 104 kg (228 lb 12.8 oz)   BMI 36.95 kg/m²     Vitals and nursing note reviewed.   Constitutional:       General: Not in acute distress.     Appearance: Well-developed. Not diaphoretic.   Eyes:      Pupils: Pupils are equal, round, and reactive to light.   HENT:      Head: Normocephalic.   Pulmonary:      Effort: Pulmonary effort is normal. No respiratory distress.      Breath sounds: Normal breath sounds.   Cardiovascular:      Normal rate. Regular rhythm.      Murmurs: There is a grade 1 to 2/6 holosystolic murmur at the apex.   Pulses:     Intact distal pulses.   Abdominal:      General: Bowel sounds are normal.      Palpations: Abdomen is soft.   Musculoskeletal: Normal range of motion.      Cervical back: Normal range of motion. Skin:     General: Skin is warm and dry.   Neurological:      Mental Status: Alert and oriented to person, place, and time.        Procedures          Problem List Items Addressed This Visit        Cardiac and Vasculature    Palpitations    Relevant Medications    nadolol (CORGARD) 20 MG tablet    Other Relevant Orders    Stress Test With Myocardial Perfusion One Day    Adult Transthoracic Echo Complete W/ Cont if Necessary Per Protocol    Hypercholesteremia - " Primary    Relevant Orders    Stress Test With Myocardial Perfusion One Day    Adult Transthoracic Echo Complete W/ Cont if Necessary Per Protocol    Essential hypertension    Relevant Medications    nadolol (CORGARD) 20 MG tablet    Other Relevant Orders    Stress Test With Myocardial Perfusion One Day    Adult Transthoracic Echo Complete W/ Cont if Necessary Per Protocol       Pulmonary and Pneumonias    Shortness of breath    Relevant Orders    Stress Test With Myocardial Perfusion One Day    Adult Transthoracic Echo Complete W/ Cont if Necessary Per Protocol      Other Visit Diagnoses     Chest pain, atypical        Relevant Orders    Stress Test With Myocardial Perfusion One Day    Adult Transthoracic Echo Complete W/ Cont if Necessary Per Protocol         1.  Palpitations-well controlled, continue Corgard. Limit caffeine.     2.  HTN-well controlled with beta-blocker.  Continue weight loss efforts.     3.  DEVIN-unable to tolerate CPAP, follows with Dr. Martinez.     4.  Hypercholesterolemia- LDL elevated at 148. 10-year ASCVD risk calculated last visit at 1.3%. Encouraged stricter diet. She may need to start statin if remains elevated or if stress test shows evidence of ischemia.     5. New and worsening chest pain with anginal characteristics. She has hyperlipidemia, HTN, tobacco use, strong family hx of heart disease in both mother and father. Recommend nuclear stress test to evaluate coronary artery perfusion, LV function, mitral regurg and PA pressure.      Further recommendations to follow stress test and echocardiogram.     Patient's Body mass index is 36.95 kg/m². indicating that she is obese (BMI >30). Obesity-related health conditions include the following: obstructive sleep apnea, hypertension, coronary heart disease, diabetes mellitus and dyslipidemias. Obesity is worsening. BMI is is above average; BMI management plan is completed. We discussed portion control and increasing exercise.    Tierney Gupta   reports that she quit smoking about 13 years ago. She has never used smokeless tobacco.            Electronically signed by DANNY Mchugh,  December 9, 2021 10:31 EST

## 2022-01-18 ENCOUNTER — HOSPITAL ENCOUNTER (OUTPATIENT)
Dept: CARDIOLOGY | Facility: HOSPITAL | Age: 47
Discharge: HOME | End: 2022-01-18

## 2022-01-18 VITALS — BODY MASS INDEX: 36.85 KG/M2 | WEIGHT: 229.28 LBS | HEIGHT: 66 IN

## 2022-01-18 DIAGNOSIS — R06.02 SHORTNESS OF BREATH: ICD-10-CM

## 2022-01-18 DIAGNOSIS — I10 ESSENTIAL HYPERTENSION: ICD-10-CM

## 2022-01-18 DIAGNOSIS — R00.2 PALPITATIONS: ICD-10-CM

## 2022-01-18 DIAGNOSIS — E78.00 HYPERCHOLESTEREMIA: ICD-10-CM

## 2022-01-18 DIAGNOSIS — R07.89 CHEST PAIN, ATYPICAL: ICD-10-CM

## 2022-01-18 LAB
AORTIC DIMENSIONLESS INDEX: 0.86 (DI)
BH CV ECHO MEAS - ACS: 1.8 CM
BH CV ECHO MEAS - AO MAX PG (FULL): 1.3 MMHG
BH CV ECHO MEAS - AO MAX PG: 5.1 MMHG
BH CV ECHO MEAS - AO MEAN PG (FULL): 1 MMHG
BH CV ECHO MEAS - AO MEAN PG: 3 MMHG
BH CV ECHO MEAS - AO ROOT AREA (BSA CORRECTED): 1.4
BH CV ECHO MEAS - AO ROOT AREA: 7.1 CM^2
BH CV ECHO MEAS - AO ROOT DIAM: 3 CM
BH CV ECHO MEAS - AO V2 MAX: 113 CM/SEC
BH CV ECHO MEAS - AO V2 MEAN: 76.5 CM/SEC
BH CV ECHO MEAS - AO V2 VTI: 25.2 CM
BH CV ECHO MEAS - BSA(HAYCOCK): 2.2 M^2
BH CV ECHO MEAS - BSA: 2.1 M^2
BH CV ECHO MEAS - BZI_BMI: 36.8 KILOGRAMS/M^2
BH CV ECHO MEAS - BZI_METRIC_HEIGHT: 167.6 CM
BH CV ECHO MEAS - BZI_METRIC_WEIGHT: 103.4 KG
BH CV ECHO MEAS - EDV(CUBED): 103.2 ML
BH CV ECHO MEAS - EDV(TEICH): 101.9 ML
BH CV ECHO MEAS - EF(CUBED): 67 %
BH CV ECHO MEAS - EF(MOD-BP): 58 %
BH CV ECHO MEAS - EF(TEICH): 58.6 %
BH CV ECHO MEAS - ESV(CUBED): 34 ML
BH CV ECHO MEAS - ESV(TEICH): 42.2 ML
BH CV ECHO MEAS - FS: 30.9 %
BH CV ECHO MEAS - IVS/LVPW: 0.89
BH CV ECHO MEAS - IVSD: 0.93 CM
BH CV ECHO MEAS - LA 3D VOL INDEX: 21
BH CV ECHO MEAS - LA DIMENSION(2D): 3.6 CM
BH CV ECHO MEAS - LA DIMENSION: 3.5 CM
BH CV ECHO MEAS - LA/AO: 1.2
BH CV ECHO MEAS - LAT PEAK E' VEL: 8.1 CM/SEC
BH CV ECHO MEAS - LV IVRT: 0.14 SEC
BH CV ECHO MEAS - LV MASS(C)D: 161.7 GRAMS
BH CV ECHO MEAS - LV MASS(C)DI: 76.4 GRAMS/M^2
BH CV ECHO MEAS - LV MAX PG: 3.8 MMHG
BH CV ECHO MEAS - LV MEAN PG: 2 MMHG
BH CV ECHO MEAS - LV V1 MAX: 97.3 CM/SEC
BH CV ECHO MEAS - LV V1 MEAN: 60.1 CM/SEC
BH CV ECHO MEAS - LV V1 VTI: 22.2 CM
BH CV ECHO MEAS - LVIDD: 4.7 CM
BH CV ECHO MEAS - LVIDS: 3.2 CM
BH CV ECHO MEAS - LVPWD: 1.1 CM
BH CV ECHO MEAS - MED PEAK E' VEL: 8.5 CM/SEC
BH CV ECHO MEAS - MV A MAX VEL: 35.4 CM/SEC
BH CV ECHO MEAS - MV DEC SLOPE: 443 CM/SEC^2
BH CV ECHO MEAS - MV DEC TIME: 0.21 SEC
BH CV ECHO MEAS - MV E MAX VEL: 69.5 CM/SEC
BH CV ECHO MEAS - MV E/A: 2
BH CV ECHO MEAS - MV MAX PG: 3.3 MMHG
BH CV ECHO MEAS - MV MEAN PG: 1 MMHG
BH CV ECHO MEAS - MV P1/2T MAX VEL: 82.1 CM/SEC
BH CV ECHO MEAS - MV P1/2T: 54.3 MSEC
BH CV ECHO MEAS - MV V2 MAX: 90.4 CM/SEC
BH CV ECHO MEAS - MV V2 MEAN: 54.8 CM/SEC
BH CV ECHO MEAS - MV V2 VTI: 25.3 CM
BH CV ECHO MEAS - MVA P1/2T LCG: 2.7 CM^2
BH CV ECHO MEAS - MVA(P1/2T): 4.1 CM^2
BH CV ECHO MEAS - PA MAX PG (FULL): 1.3 MMHG
BH CV ECHO MEAS - PA MAX PG: 3.5 MMHG
BH CV ECHO MEAS - PA MEAN PG (FULL): 1 MMHG
BH CV ECHO MEAS - PA MEAN PG: 2 MMHG
BH CV ECHO MEAS - PA V2 MAX: 93.3 CM/SEC
BH CV ECHO MEAS - PA V2 MEAN: 61.9 CM/SEC
BH CV ECHO MEAS - PA V2 VTI: 21 CM
BH CV ECHO MEAS - RAP SYSTOLE: 10 MMHG
BH CV ECHO MEAS - RV MAX PG: 2.1 MMHG
BH CV ECHO MEAS - RV MEAN PG: 1 MMHG
BH CV ECHO MEAS - RV V1 MAX: 73.1 CM/SEC
BH CV ECHO MEAS - RV V1 MEAN: 45.7 CM/SEC
BH CV ECHO MEAS - RV V1 VTI: 17 CM
BH CV ECHO MEAS - RVDD: 3 CM
BH CV ECHO MEAS - RVSP: 17 MMHG
BH CV ECHO MEAS - SI(AO): 84.2 ML/M^2
BH CV ECHO MEAS - SI(CUBED): 32.7 ML/M^2
BH CV ECHO MEAS - SI(TEICH): 28.2 ML/M^2
BH CV ECHO MEAS - SV(AO): 178.1 ML
BH CV ECHO MEAS - SV(CUBED): 69.1 ML
BH CV ECHO MEAS - SV(TEICH): 59.6 ML
BH CV ECHO MEAS - TR MAX VEL: 125 CM/SEC
BH CV ECHO MEASUREMENTS AVERAGE E/E' RATIO: 8.37
BH CV NUCLEAR PRIOR STUDY: 3
BH CV REST NUCLEAR ISOTOPE DOSE: 10 MCI
BH CV STRESS NUCLEAR ISOTOPE DOSE: 30 MCI
BH CV STRESS RECOVERY BP: NORMAL MMHG
BH CV STRESS RECOVERY HR: 77 BPM
IVRT: 137 MSEC
LV EF NUC BP: 59 %
MAXIMAL PREDICTED HEART RATE: 172 BPM
MAXIMAL PREDICTED HEART RATE: 172 BPM
PERCENT MAX PREDICTED HR: 73.26 %
SINUS: 2.8 CM
STRESS BASELINE BP: NORMAL MMHG
STRESS BASELINE HR: 67 BPM
STRESS PERCENT HR: 86 %
STRESS POST ESTIMATED WORKLOAD: 10.1 METS
STRESS POST EXERCISE DUR MIN: 7 MIN
STRESS POST EXERCISE DUR SEC: 30 SEC
STRESS POST PEAK BP: NORMAL MMHG
STRESS POST PEAK HR: 126 BPM
STRESS TARGET HR: 146 BPM
STRESS TARGET HR: 146 BPM

## 2022-01-18 PROCEDURE — A9500 TC99M SESTAMIBI: HCPCS | Performed by: INTERNAL MEDICINE

## 2022-01-18 PROCEDURE — 78452 HT MUSCLE IMAGE SPECT MULT: CPT

## 2022-01-18 PROCEDURE — 78452 HT MUSCLE IMAGE SPECT MULT: CPT | Performed by: INTERNAL MEDICINE

## 2022-01-18 PROCEDURE — 0 TECHNETIUM SESTAMIBI: Performed by: INTERNAL MEDICINE

## 2022-01-18 PROCEDURE — 93018 CV STRESS TEST I&R ONLY: CPT | Performed by: INTERNAL MEDICINE

## 2022-01-18 PROCEDURE — 93306 TTE W/DOPPLER COMPLETE: CPT

## 2022-01-18 PROCEDURE — 93306 TTE W/DOPPLER COMPLETE: CPT | Performed by: INTERNAL MEDICINE

## 2022-01-18 PROCEDURE — 93017 CV STRESS TEST TRACING ONLY: CPT

## 2022-01-18 RX ADMIN — TECHNETIUM TC 99M SESTAMIBI 1 DOSE: 1 INJECTION INTRAVENOUS at 09:44

## 2022-01-18 RX ADMIN — TECHNETIUM TC 99M SESTAMIBI 1 DOSE: 1 INJECTION INTRAVENOUS at 07:58

## 2022-07-13 ENCOUNTER — OFFICE VISIT (OUTPATIENT)
Dept: CARDIOLOGY | Facility: CLINIC | Age: 47
End: 2022-07-13

## 2022-07-13 VITALS
DIASTOLIC BLOOD PRESSURE: 70 MMHG | HEART RATE: 60 BPM | WEIGHT: 233.2 LBS | HEIGHT: 66 IN | BODY MASS INDEX: 37.48 KG/M2 | SYSTOLIC BLOOD PRESSURE: 114 MMHG

## 2022-07-13 DIAGNOSIS — R00.2 PALPITATIONS: Primary | ICD-10-CM

## 2022-07-13 DIAGNOSIS — H53.8 BLURRED VISION: ICD-10-CM

## 2022-07-13 PROCEDURE — 99214 OFFICE O/P EST MOD 30 MIN: CPT | Performed by: NURSE PRACTITIONER

## 2022-07-13 NOTE — PROGRESS NOTES
Chief Complaint   Patient presents with   • Follow-up     Cardiac management   • LABS and TESTING     Stress test and Echo on done January 2022 . LABS- Had labs last week per PCP, will follow up on Friday to get results   • Chest Pain     Reports having chest discomfort mid chest. Said she notices it more in afternoon/evening   • Palpitations     Reports she has palpitations daily. She said flutters have gotten more intense than normal over the last couple of weeks.   • Fatigue     Has c/o feeling fatigued all the time. She has a new diagnosis of Alexy mountain spotted fever. Also had Shingles in March 2022   • Med Refill     No refills needed today. Had med list today.     Real Gupta is a 47 y.o. female with hypothyroidism, mild HTN, palpitations, sleep apnea and metabolic syndrome.  She was seen in 2008 for palpitations found to have sinus tachycardia and mild PHTN.  Did not tolerate metoprolol, Corgard, diltiazem.  Later, decided to go back on nadolol. Our office was contacted in March 2021 for LE pain. CTA LE recommended but insurance denied without first having TABATHA or US. She completed this with PCP office reported as normal. DEVIN was diagnosed but CPAP not tolerated.     Stress test and echocardiogram repeated 1/18/2022 showing normal blood pressure response, no EKG changes.  Changes in the anteroseptal wall felt to be related to breast attenuation.  No changes in plan of care made.    She returns today for regular follow up visit. 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 She had more labs done with PCP, planning to return later this week for results. Since acute illness, she c/o increasing palpitations. Chest pain present but has improved. She is under quite a bit of stress caring for multiple family members and running a .  Lipids 5/2021 , HDL 36.          Cardiac History:    Past Surgical History:   Procedure Laterality Date   • CARDIOVASCULAR STRESS  TEST  08/28/2008    R. Stress- 6 Min, 30 Secs. 84% THR. Negative   • CARDIOVASCULAR STRESS TEST  01/18/2022    7 Min. 30 Secs. 10.1 METS. 78% THR. BP- 157/80. EF 59%. Breast attenuation   • CONVERTED (HISTORICAL) HOLTER  07/03/2008    AVG HR 82 BPM. Max-140 BPM.   • CONVERTED (HISTORICAL) HOLTER  07/10/2018    Baseline NSR with some sinus tach high 151, nadolol d/c and cardizem started   • ECHO - CONVERTED  06/30/2008    EF >60%. RVSP 33 mmHg.   • ECHO - CONVERTED  06/28/2018    EF 65%. RVSP- 16 mmHg.   • ECHO - CONVERTED  07/12/2018    EF 60-65%, mild MR, no AS, diastolic dysfunction, RSVP 16mmHg   • ECHO - CONVERTED  01/18/2022    EF 65%. Trace-Mild MR. RVSP- 17 mmHg     Current Outpatient Medications   Medication Sig Dispense Refill   • albuterol (PROVENTIL HFA;VENTOLIN HFA) 108 (90 Base) MCG/ACT inhaler Inhale 2 puffs Every 4 (Four) Hours As Needed for Wheezing.     • ascorbic acid (VITAMIN C) 1000 MG tablet Take 1,000 mg by mouth Daily.     • Cholecalciferol (VITAMIN D3) 2000 units tablet Take 1,000 Units by mouth 2 (Two) Times a Day.     • Diclofenac Sodium (VOLTAREN) 1 % gel gel Apply 4 g topically to the appropriate area as directed 2 (Two) Times a Day As Needed.     • fluticasone (FLONASE) 50 MCG/ACT nasal spray 2 sprays into the nostril(s) as directed by provider Daily.     • fluticasone-salmeterol (ADVAIR) 250-50 MCG/DOSE DISKUS Inhale 2 (Two) Times a Day.     • ipratropium-albuterol (DUO-NEB) 0.5-2.5 mg/3 ml nebulizer Take 3 mL by nebulization Every 4 (Four) Hours As Needed for Wheezing.     • ketoconazole (NIZORAL) 2 % shampoo Apply  topically to the appropriate area as directed Daily As Needed for Dandruff.     • levocetirizine (XYZAL) 5 MG tablet Take 5 mg by mouth Every Evening.     • levothyroxine (SYNTHROID, LEVOTHROID) 125 MCG tablet Take 125 mcg by mouth Daily.     • mometasone-formoterol (DULERA 200) 200-5 MCG/ACT inhaler Inhale 2 puffs 2 (Two) Times a Day.     • montelukast (SINGULAIR) 10 MG  tablet Take 10 mg by mouth Every Night.     • nadolol (CORGARD) 20 MG tablet Three times daily 270 tablet 4   • omeprazole (priLOSEC) 20 MG capsule Take 20 mg by mouth Daily.     • sertraline (ZOLOFT) 100 MG tablet Take 100 mg by mouth Daily.     • tiotropium (SPIRIVA) 18 MCG per inhalation capsule Place 1 capsule into inhaler and inhale Daily.     • triamcinolone (KENALOG) 0.1 % cream Apply 1 application topically to the appropriate area as directed 2 (Two) Times a Day.       No current facility-administered medications for this visit.       Diphenhydramine, Aspirin, Atorvastatin, Augmentin [amoxicillin-pot clavulanate], Levaquin [levofloxacin], Penicillins, Zyrtec [cetirizine], and Hydroxyzine    Past Medical History:   Diagnosis Date   • Dilatation of esophagus    • H/O hernia repair    • H/O total hysterectomy    • H/O:      x2   • History of carpal tunnel release    • History of cholecystectomy    • Hyperlipidemia    • Hypothyroidism    • Palpitations    • Alexy Mountain spotted fever 2022   • Shingles 2022   • Sleep apnea     uses CPAP   • Tachycardia      Social History     Socioeconomic History   • Marital status: Single   Tobacco Use   • Smoking status: Former Smoker     Quit date:      Years since quittin.5   • Smokeless tobacco: Never Used   • Tobacco comment: recently quit smoking, currently wearing patch    Vaping Use   • Vaping Use: Never used   Substance and Sexual Activity   • Alcohol use: Not Currently     Comment: occasionally   • Drug use: No     Family History   Problem Relation Age of Onset   • Heart disease Mother    • Hypertension Mother    • Hyperlipidemia Mother    • Heart attack Father    • No Known Problems Brother      Review of Systems   Constitutional: Positive for malaise/fatigue and weight gain (+4). Negative for decreased appetite.   HENT: Negative.    Eyes: Negative for blurred vision.   Cardiovascular: Positive for chest pain and palpitations. Negative for  "dyspnea on exertion, leg swelling and syncope.   Respiratory: Negative for shortness of breath and sleep disturbances due to breathing.    Endocrine: Negative.    Hematologic/Lymphatic: Negative for bleeding problem. Does not bruise/bleed easily.   Skin: Negative.    Musculoskeletal: Negative for falls and myalgias.   Gastrointestinal: Negative for abdominal pain, heartburn and melena.   Genitourinary: Negative for hematuria.   Neurological: Negative for dizziness and light-headedness.   Psychiatric/Behavioral: Negative for altered mental status.   Allergic/Immunologic: Negative.         Objective     /70 (BP Location: Left arm)   Pulse 60   Ht 167 cm (65.75\")   Wt 106 kg (233 lb 3.2 oz)   BMI 37.93 kg/m²     Vitals and nursing note reviewed.   Constitutional:       General: Not in acute distress.     Appearance: Well-developed. Not diaphoretic.   Eyes:      Pupils: Pupils are equal, round, and reactive to light.   HENT:      Head: Normocephalic.   Pulmonary:      Effort: Pulmonary effort is normal. No respiratory distress.      Breath sounds: Normal breath sounds.   Cardiovascular:      Normal rate. Regular rhythm.      Murmurs: There is a grade 1/6 systolic murmur.   Pulses:     Intact distal pulses.   Edema:     Peripheral edema absent.   Abdominal:      General: Bowel sounds are normal.      Palpations: Abdomen is soft.   Musculoskeletal: Normal range of motion.      Cervical back: Normal range of motion. Skin:     General: Skin is warm and dry.   Neurological:      Mental Status: Alert and oriented to person, place, and time.        Procedures          Problem List Items Addressed This Visit        Cardiac and Vasculature    Palpitations - Primary    Relevant Orders    Holter Monitor - 72 Hour Up To 15 Days      Other Visit Diagnoses     Blurred vision        Relevant Orders    Holter Monitor - 72 Hour Up To 15 Days         1. Palpitations- continue nadolol TID, heart rate and rhythm normal today. " Will place holter x 7 days. Limit caffeine.     2. DEVIN- uncorrected, not able to tolerate CPAP.    3. HTN- well controlled with Corgard. Na restriction, weight loss.     4. Hypercholesterolemia- LDL elevated at 148. 10-year ASCVD risk calculated last visit at 1.3%. Encouraged heart healthy diet. No ischemia noted on stress.     Class 2 Severe Obesity (BMI >=35 and <=39.9). Obesity-related health conditions include the following: obstructive sleep apnea, hypertension, coronary heart disease, diabetes mellitus, dyslipidemias and GERD. Obesity is worsening. BMI is is above average; BMI management plan is completed. We discussed portion control and increasing exercise.               Electronically signed by DANNY Mchugh,  July 16, 2022 11:25 EDT

## 2023-01-25 ENCOUNTER — OFFICE VISIT (OUTPATIENT)
Dept: CARDIOLOGY | Facility: CLINIC | Age: 48
End: 2023-01-25
Payer: COMMERCIAL

## 2023-01-25 VITALS
BODY MASS INDEX: 37.19 KG/M2 | DIASTOLIC BLOOD PRESSURE: 64 MMHG | HEART RATE: 72 BPM | SYSTOLIC BLOOD PRESSURE: 104 MMHG | HEIGHT: 66 IN | WEIGHT: 231.4 LBS

## 2023-01-25 DIAGNOSIS — E78.00 HYPERCHOLESTEREMIA: ICD-10-CM

## 2023-01-25 DIAGNOSIS — I10 ESSENTIAL HYPERTENSION: Primary | ICD-10-CM

## 2023-01-25 DIAGNOSIS — G47.30 SLEEP APNEA IN ADULT: ICD-10-CM

## 2023-01-25 DIAGNOSIS — R00.2 PALPITATIONS: ICD-10-CM

## 2023-01-25 PROCEDURE — 99214 OFFICE O/P EST MOD 30 MIN: CPT | Performed by: NURSE PRACTITIONER

## 2023-01-25 RX ORDER — AZITHROMYCIN 250 MG/1
TABLET, FILM COATED ORAL
Qty: 6 TABLET | Refills: 0 | Status: SHIPPED | OUTPATIENT
Start: 2023-01-25

## 2023-01-25 RX ORDER — ALIROCUMAB 75 MG/ML
INJECTION, SOLUTION SUBCUTANEOUS
COMMUNITY

## 2023-01-25 NOTE — LETTER
January 30, 2023     DANNY Jin  [address]  [facility] [address]    Patient: Tierney Gupta   YOB: 1975   Date of Visit: 1/25/2023       Dear DANNY Jin    Tierney Gupta was in my office today. Below is a copy of my note.    If you have questions, please do not hesitate to call me. I look forward to following Tierney along with you.         Sincerely,        Olga D DANNY Gupta        CC: No Recipients    Chief Complaint   Patient presents with   • Follow-up     Cardiac management   • Lab     Last labs in chart. PCP has started Praluent.   • Palpitations     Doing better since taking Nadolol TID.   • Chest Pain     Having random episodes of sharp pain in mid chest, lasting a few seconds. Non radiating, and no nausea associated with pain.    • Shortness of Breath     Feels related to asthma. Wears CPAP at night, follows with Dr Sahu.   • Med Refill     PCP writes refills.     Subjective       Tierney Gupta is a 47 y.o. female with hypothyroidism, mild HTN, palpitations, sleep apnea and metabolic syndrome.  She was seen in 2008 for palpitations found to have sinus tachycardia and mild PHTN.  Did not tolerate metoprolol, Corgard, diltiazem.  Later, decided to go back on nadolol. Our office was contacted in March 2021 for LE pain. CTA LE recommended but insurance denied without first having TABATHA or US. She completed this with PCP office reported as normal. DEVIN was diagnosed but CPAP not tolerated.      Stress test and echocardiogram repeated 1/18/2022 showing normal blood pressure response, no EKG changes.  Changes in the anteroseptal wall felt to be related to breast attenuation.  No changes in plan of care made. She was treated for RMSF. Palpitations increased. Holter monitor placed showing rare PAC/PVC.     She returns today for follow up. Denies new or worsening cardiac symptoms. She does however have increasing congestion and cough. She has wheezing and is short of breath. She is wearing  CPAP at night. Lipids 5/2021 , HDL 36. She is statin intolerant, was prescribed Praluent.     Cardiac History:    Past Surgical History:   Procedure Laterality Date   • CARDIOVASCULAR STRESS TEST  08/28/2008    R. Stress- 6 Min, 30 Secs. 84% THR. Negative   • CARDIOVASCULAR STRESS TEST  01/18/2022    7 Min. 30 Secs. 10.1 METS. 78% THR. BP- 157/80. EF 59%. Breast attenuation   • CONVERTED (HISTORICAL) HOLTER  07/03/2008    AVG HR 82 BPM. Max-140 BPM.   • CONVERTED (HISTORICAL) HOLTER  07/10/2018    Baseline NSR with some sinus tach high 151, nadolol d/c and cardizem started   • CONVERTED (HISTORICAL) HOLTER  08/02/2022    > 6 Days. Avg 79. . Rare PAC & PVC   • ECHO - CONVERTED  06/30/2008    EF >60%. RVSP 33 mmHg.   • ECHO - CONVERTED  06/28/2018    EF 65%. RVSP- 16 mmHg.   • ECHO - CONVERTED  07/12/2018    EF 60-65%, mild MR, no AS, diastolic dysfunction, RSVP 16mmHg   • ECHO - CONVERTED  01/18/2022    EF 65%. Trace-Mild MR. RVSP- 17 mmHg     Current Outpatient Medications   Medication Sig Dispense Refill   • albuterol (PROVENTIL HFA;VENTOLIN HFA) 108 (90 Base) MCG/ACT inhaler Inhale 2 puffs Every 4 (Four) Hours As Needed for Wheezing.     • Alirocumab (Praluent) 75 MG/ML solution auto-injector Inject  under the skin into the appropriate area as directed Every 14 (Fourteen) Days.     • ascorbic acid (VITAMIN C) 1000 MG tablet Take 1,000 mg by mouth Daily.     • Cholecalciferol (VITAMIN D3) 2000 units tablet Take 1,000 Units by mouth 2 (Two) Times a Day.     • Diclofenac Sodium (VOLTAREN) 1 % gel gel Apply 4 g topically to the appropriate area as directed 2 (Two) Times a Day As Needed.     • fluticasone (FLONASE) 50 MCG/ACT nasal spray 2 sprays into the nostril(s) as directed by provider Daily.     • fluticasone-salmeterol (ADVAIR) 250-50 MCG/DOSE DISKUS Inhale 2 (Two) Times a Day.     • ipratropium-albuterol (DUO-NEB) 0.5-2.5 mg/3 ml nebulizer Take 3 mL by nebulization Every 4 (Four) Hours As Needed for  Wheezing.     • ketoconazole (NIZORAL) 2 % shampoo Apply  topically to the appropriate area as directed Daily As Needed for Dandruff.     • levocetirizine (XYZAL) 5 MG tablet Take 5 mg by mouth Every Evening.     • levothyroxine (SYNTHROID, LEVOTHROID) 125 MCG tablet Take 125 mcg by mouth Daily.     • montelukast (SINGULAIR) 10 MG tablet Take 10 mg by mouth Every Night.     • nadolol (CORGARD) 20 MG tablet Three times daily 270 tablet 4   • omeprazole (priLOSEC) 20 MG capsule Take 20 mg by mouth Daily.     • sertraline (ZOLOFT) 100 MG tablet Take 100 mg by mouth Daily.     • triamcinolone (KENALOG) 0.1 % cream Apply 1 application topically to the appropriate area as directed 2 (Two) Times a Day.     • azithromycin (Zithromax Z-Dedrick) 250 MG tablet Take 2 tablets by mouth on day 1, then 1 tablet daily on days 2-5 6 tablet 0     No current facility-administered medications for this visit.     Diphenhydramine, Aspirin, Atorvastatin, Augmentin [amoxicillin-pot clavulanate], Levaquin [levofloxacin], Penicillins, Zyrtec [cetirizine], and Hydroxyzine    Past Medical History:   Diagnosis Date   • Dilatation of esophagus    • H/O hernia repair    • H/O total hysterectomy    • H/O:      x2   • History of carpal tunnel release    • History of cholecystectomy    • Hyperlipidemia    • Hypothyroidism    • Palpitations    • Alexy Mountain spotted fever 2022   • Shingles 2022   • Sleep apnea     uses CPAP   • Tachycardia      Social History     Socioeconomic History   • Marital status: Single   Tobacco Use   • Smoking status: Former     Types: Cigarettes     Quit date:      Years since quitting: 15.0   • Smokeless tobacco: Never   • Tobacco comments:     recently quit smoking, currently wearing patch    Vaping Use   • Vaping Use: Never used   Substance and Sexual Activity   • Alcohol use: Yes     Comment: occasionally   • Drug use: No   • Sexual activity: Defer     Family History   Problem Relation Age of Onset   •  "Heart disease Mother    • Hypertension Mother    • Hyperlipidemia Mother    • Heart attack Father    • No Known Problems Brother      Review of Systems   Constitutional: Positive for malaise/fatigue and weight loss (-2). Negative for decreased appetite.   HENT: Negative.    Eyes: Negative for blurred vision.   Cardiovascular: Positive for chest pain and dyspnea on exertion. Negative for leg swelling, palpitations and syncope.   Respiratory: Positive for cough, shortness of breath and wheezing. Negative for sleep disturbances due to breathing.    Endocrine: Negative.    Hematologic/Lymphatic: Negative for bleeding problem. Does not bruise/bleed easily.   Skin: Negative.    Musculoskeletal: Negative for falls and myalgias.   Gastrointestinal: Negative for abdominal pain, heartburn and melena.   Genitourinary: Negative for hematuria.   Neurological: Negative for dizziness and light-headedness.   Psychiatric/Behavioral: Negative for altered mental status.   Allergic/Immunologic: Negative.       Objective      /64 (BP Location: Right arm)   Pulse 72   Ht 167 cm (65.75\")   Wt 105 kg (231 lb 6.4 oz)   BMI 37.64 kg/m²     Vitals and nursing note reviewed.   Constitutional:       General: Not in acute distress.     Appearance: Well-developed. Not diaphoretic.   Eyes:      Pupils: Pupils are equal, round, and reactive to light.   HENT:      Head: Normocephalic.   Pulmonary:      Effort: Pulmonary effort is normal. No respiratory distress.      Breath sounds: Examination of the right-middle field reveals rhonchi. Examination of the left-middle field reveals rhonchi. Wheezing present. Rhonchi present.   Cardiovascular:      Normal rate. Regular rhythm.   Pulses:     Intact distal pulses.   Abdominal:      General: Bowel sounds are normal.      Palpations: Abdomen is soft.   Musculoskeletal: Normal range of motion.      Cervical back: Normal range of motion. Skin:     General: Skin is warm and dry.   Neurological:      " Mental Status: Alert and oriented to person, place, and time.        Procedures         Problem List Items Addressed This Visit        Cardiac and Vasculature    Palpitations    Hypercholesteremia    Relevant Medications    Alirocumab (Praluent) 75 MG/ML solution auto-injector    Essential hypertension - Primary       Sleep    Sleep apnea in adult      1. Palpitations- continue nadolol TID, heart rate and rhythm normal today. Holter reviewed showing rare PAC/PVC. Limit caffeine.      2. DEVIN- she is trying to wear CPAP, followed by Dr. Martinez.      3. HTN- well controlled with Corgard. Na restriction, weight loss.      4. Hypercholesterolemia- LDL elevated at 148. She was able to get PCSK9 through PCP office. Continue same.     5. Acute exacerbation COPD/bronchitis-  azithromycin x 5 days, medrol dose pack. Advised to follow up with PCP if not improved.     Class 2 Severe Obesity (BMI >=35 and <=39.9). Obesity-related health conditions include the following: obstructive sleep apnea and hypertension. Obesity is improving with lifestyle modifications. BMI is is above average; BMI management plan is completed. We discussed portion control and increasing exercise.              Electronically signed by DANNY Mchugh,  January 30, 2023 06:17 EST

## 2023-01-25 NOTE — PROGRESS NOTES
Chief Complaint   Patient presents with   • Follow-up     Cardiac management   • Lab     Last labs in chart. PCP has started Praluent.   • Palpitations     Doing better since taking Nadolol TID.   • Chest Pain     Having random episodes of sharp pain in mid chest, lasting a few seconds. Non radiating, and no nausea associated with pain.    • Shortness of Breath     Feels related to asthma. Wears CPAP at night, follows with Dr Sahu.   • Med Refill     PCP writes refills.     Subjective       Tierney Gupta is a 47 y.o. female with hypothyroidism, mild HTN, palpitations, sleep apnea and metabolic syndrome.  She was seen in 2008 for palpitations found to have sinus tachycardia and mild PHTN.  Did not tolerate metoprolol, Corgard, diltiazem.  Later, decided to go back on nadolol. Our office was contacted in March 2021 for LE pain. CTA LE recommended but insurance denied without first having TABATHA or US. She completed this with PCP office reported as normal. DEVIN was diagnosed but CPAP not tolerated.      Stress test and echocardiogram repeated 1/18/2022 showing normal blood pressure response, no EKG changes.  Changes in the anteroseptal wall felt to be related to breast attenuation.  No changes in plan of care made. She was treated for RMSF. Palpitations increased. Holter monitor placed showing rare PAC/PVC.     She returns today for follow up. Denies new or worsening cardiac symptoms. She does however have increasing congestion and cough. She has wheezing and is short of breath. She is wearing CPAP at night. Lipids 5/2021 , HDL 36. She is statin intolerant, was prescribed Praluent.      Cardiac History:    Past Surgical History:   Procedure Laterality Date   • CARDIOVASCULAR STRESS TEST  08/28/2008    R. Stress- 6 Min, 30 Secs. 84% THR. Negative   • CARDIOVASCULAR STRESS TEST  01/18/2022    7 Min. 30 Secs. 10.1 METS. 78% THR. BP- 157/80. EF 59%. Breast attenuation   • CONVERTED (HISTORICAL) HOLTER  07/03/2008     AVG HR 82 BPM. Max-140 BPM.   • CONVERTED (HISTORICAL) HOLTER  07/10/2018    Baseline NSR with some sinus tach high 151, nadolol d/c and cardizem started   • CONVERTED (HISTORICAL) HOLTER  08/02/2022    > 6 Days. Avg 79. . Rare PAC & PVC   • ECHO - CONVERTED  06/30/2008    EF >60%. RVSP 33 mmHg.   • ECHO - CONVERTED  06/28/2018    EF 65%. RVSP- 16 mmHg.   • ECHO - CONVERTED  07/12/2018    EF 60-65%, mild MR, no AS, diastolic dysfunction, RSVP 16mmHg   • ECHO - CONVERTED  01/18/2022    EF 65%. Trace-Mild MR. RVSP- 17 mmHg     Current Outpatient Medications   Medication Sig Dispense Refill   • albuterol (PROVENTIL HFA;VENTOLIN HFA) 108 (90 Base) MCG/ACT inhaler Inhale 2 puffs Every 4 (Four) Hours As Needed for Wheezing.     • Alirocumab (Praluent) 75 MG/ML solution auto-injector Inject  under the skin into the appropriate area as directed Every 14 (Fourteen) Days.     • ascorbic acid (VITAMIN C) 1000 MG tablet Take 1,000 mg by mouth Daily.     • Cholecalciferol (VITAMIN D3) 2000 units tablet Take 1,000 Units by mouth 2 (Two) Times a Day.     • Diclofenac Sodium (VOLTAREN) 1 % gel gel Apply 4 g topically to the appropriate area as directed 2 (Two) Times a Day As Needed.     • fluticasone (FLONASE) 50 MCG/ACT nasal spray 2 sprays into the nostril(s) as directed by provider Daily.     • fluticasone-salmeterol (ADVAIR) 250-50 MCG/DOSE DISKUS Inhale 2 (Two) Times a Day.     • ipratropium-albuterol (DUO-NEB) 0.5-2.5 mg/3 ml nebulizer Take 3 mL by nebulization Every 4 (Four) Hours As Needed for Wheezing.     • ketoconazole (NIZORAL) 2 % shampoo Apply  topically to the appropriate area as directed Daily As Needed for Dandruff.     • levocetirizine (XYZAL) 5 MG tablet Take 5 mg by mouth Every Evening.     • levothyroxine (SYNTHROID, LEVOTHROID) 125 MCG tablet Take 125 mcg by mouth Daily.     • montelukast (SINGULAIR) 10 MG tablet Take 10 mg by mouth Every Night.     • nadolol (CORGARD) 20 MG tablet Three times daily 270  tablet 4   • omeprazole (priLOSEC) 20 MG capsule Take 20 mg by mouth Daily.     • sertraline (ZOLOFT) 100 MG tablet Take 100 mg by mouth Daily.     • triamcinolone (KENALOG) 0.1 % cream Apply 1 application topically to the appropriate area as directed 2 (Two) Times a Day.     • azithromycin (Zithromax Z-Dedrick) 250 MG tablet Take 2 tablets by mouth on day 1, then 1 tablet daily on days 2-5 6 tablet 0     No current facility-administered medications for this visit.     Diphenhydramine, Aspirin, Atorvastatin, Augmentin [amoxicillin-pot clavulanate], Levaquin [levofloxacin], Penicillins, Zyrtec [cetirizine], and Hydroxyzine    Past Medical History:   Diagnosis Date   • Dilatation of esophagus    • H/O hernia repair    • H/O total hysterectomy    • H/O:      x2   • History of carpal tunnel release    • History of cholecystectomy    • Hyperlipidemia    • Hypothyroidism    • Palpitations    • Alexy Mountain spotted fever 2022   • Shingles 2022   • Sleep apnea     uses CPAP   • Tachycardia      Social History     Socioeconomic History   • Marital status: Single   Tobacco Use   • Smoking status: Former     Types: Cigarettes     Quit date:      Years since quitting: 15.0   • Smokeless tobacco: Never   • Tobacco comments:     recently quit smoking, currently wearing patch    Vaping Use   • Vaping Use: Never used   Substance and Sexual Activity   • Alcohol use: Yes     Comment: occasionally   • Drug use: No   • Sexual activity: Defer     Family History   Problem Relation Age of Onset   • Heart disease Mother    • Hypertension Mother    • Hyperlipidemia Mother    • Heart attack Father    • No Known Problems Brother      Review of Systems   Constitutional: Positive for malaise/fatigue and weight loss (-2). Negative for decreased appetite.   HENT: Negative.    Eyes: Negative for blurred vision.   Cardiovascular: Positive for chest pain and dyspnea on exertion. Negative for leg swelling, palpitations and syncope.  "  Respiratory: Positive for cough, shortness of breath and wheezing. Negative for sleep disturbances due to breathing.    Endocrine: Negative.    Hematologic/Lymphatic: Negative for bleeding problem. Does not bruise/bleed easily.   Skin: Negative.    Musculoskeletal: Negative for falls and myalgias.   Gastrointestinal: Negative for abdominal pain, heartburn and melena.   Genitourinary: Negative for hematuria.   Neurological: Negative for dizziness and light-headedness.   Psychiatric/Behavioral: Negative for altered mental status.   Allergic/Immunologic: Negative.       Objective     /64 (BP Location: Right arm)   Pulse 72   Ht 167 cm (65.75\")   Wt 105 kg (231 lb 6.4 oz)   BMI 37.64 kg/m²     Vitals and nursing note reviewed.   Constitutional:       General: Not in acute distress.     Appearance: Well-developed. Not diaphoretic.   Eyes:      Pupils: Pupils are equal, round, and reactive to light.   HENT:      Head: Normocephalic.   Pulmonary:      Effort: Pulmonary effort is normal. No respiratory distress.      Breath sounds: Examination of the right-middle field reveals rhonchi. Examination of the left-middle field reveals rhonchi. Wheezing present. Rhonchi present.   Cardiovascular:      Normal rate. Regular rhythm.   Pulses:     Intact distal pulses.   Abdominal:      General: Bowel sounds are normal.      Palpations: Abdomen is soft.   Musculoskeletal: Normal range of motion.      Cervical back: Normal range of motion. Skin:     General: Skin is warm and dry.   Neurological:      Mental Status: Alert and oriented to person, place, and time.        Procedures          Problem List Items Addressed This Visit        Cardiac and Vasculature    Palpitations    Hypercholesteremia    Relevant Medications    Alirocumab (Praluent) 75 MG/ML solution auto-injector    Essential hypertension - Primary       Sleep    Sleep apnea in adult      1. Palpitations- continue nadolol TID, heart rate and rhythm normal today. " Holter reviewed showing rare PAC/PVC. Limit caffeine.      2. DEVIN- she is trying to wear CPAP, followed by Dr. Martinez.      3. HTN- well controlled with Corgard. Na restriction, weight loss.      4. Hypercholesterolemia- LDL elevated at 148. She was able to get PCSK9 through PCP office. Continue same.     5. Acute exacerbation COPD/bronchitis-  azithromycin x 5 days, medrol dose pack. Advised to follow up with PCP if not improved.     Class 2 Severe Obesity (BMI >=35 and <=39.9). Obesity-related health conditions include the following: obstructive sleep apnea and hypertension. Obesity is improving with lifestyle modifications. BMI is is above average; BMI management plan is completed. We discussed portion control and increasing exercise.               Electronically signed by DANNY Mchugh,  January 30, 2023 06:17 EST

## 2023-08-02 ENCOUNTER — OFFICE VISIT (OUTPATIENT)
Dept: CARDIOLOGY | Facility: CLINIC | Age: 48
End: 2023-08-02
Payer: COMMERCIAL

## 2023-08-02 VITALS
SYSTOLIC BLOOD PRESSURE: 110 MMHG | HEIGHT: 66 IN | BODY MASS INDEX: 38.02 KG/M2 | HEART RATE: 72 BPM | WEIGHT: 236.6 LBS | DIASTOLIC BLOOD PRESSURE: 70 MMHG

## 2023-08-02 DIAGNOSIS — R06.02 SHORTNESS OF BREATH: ICD-10-CM

## 2023-08-02 DIAGNOSIS — E78.00 HYPERCHOLESTEREMIA: ICD-10-CM

## 2023-08-02 DIAGNOSIS — I10 ESSENTIAL HYPERTENSION: Primary | ICD-10-CM

## 2023-08-02 DIAGNOSIS — R00.2 PALPITATIONS: ICD-10-CM

## 2023-08-02 RX ORDER — TIOTROPIUM BROMIDE INHALATION SPRAY 3.12 UG/1
2 SPRAY, METERED RESPIRATORY (INHALATION)
COMMUNITY

## 2023-08-02 RX ORDER — BUSPIRONE HYDROCHLORIDE 5 MG/1
5 TABLET ORAL 3 TIMES DAILY PRN
COMMUNITY

## 2024-02-14 ENCOUNTER — OFFICE VISIT (OUTPATIENT)
Dept: CARDIOLOGY | Facility: CLINIC | Age: 49
End: 2024-02-14
Payer: COMMERCIAL

## 2024-02-14 VITALS
BODY MASS INDEX: 37.03 KG/M2 | HEART RATE: 76 BPM | HEIGHT: 66 IN | WEIGHT: 230.4 LBS | DIASTOLIC BLOOD PRESSURE: 60 MMHG | SYSTOLIC BLOOD PRESSURE: 100 MMHG

## 2024-02-14 DIAGNOSIS — R06.02 SHORTNESS OF BREATH: ICD-10-CM

## 2024-02-14 DIAGNOSIS — E78.00 HYPERCHOLESTEREMIA: ICD-10-CM

## 2024-02-14 DIAGNOSIS — R00.2 PALPITATIONS: Primary | ICD-10-CM

## 2024-02-14 DIAGNOSIS — G47.30 SLEEP APNEA IN ADULT: ICD-10-CM

## 2024-02-14 DIAGNOSIS — I10 ESSENTIAL HYPERTENSION: ICD-10-CM

## 2024-02-14 PROCEDURE — 3078F DIAST BP <80 MM HG: CPT | Performed by: NURSE PRACTITIONER

## 2024-02-14 PROCEDURE — 3074F SYST BP LT 130 MM HG: CPT | Performed by: NURSE PRACTITIONER

## 2024-02-14 PROCEDURE — 1160F RVW MEDS BY RX/DR IN RCRD: CPT | Performed by: NURSE PRACTITIONER

## 2024-02-14 PROCEDURE — 1159F MED LIST DOCD IN RCRD: CPT | Performed by: NURSE PRACTITIONER

## 2024-02-14 PROCEDURE — 99214 OFFICE O/P EST MOD 30 MIN: CPT | Performed by: NURSE PRACTITIONER

## 2024-02-14 RX ORDER — SERTRALINE HYDROCHLORIDE 25 MG/1
25 TABLET, FILM COATED ORAL DAILY
COMMUNITY

## 2024-02-15 PROBLEM — I49.3 PVC (PREMATURE VENTRICULAR CONTRACTION): Status: ACTIVE | Noted: 2024-02-15

## 2024-08-20 ENCOUNTER — OFFICE VISIT (OUTPATIENT)
Dept: CARDIOLOGY | Facility: CLINIC | Age: 49
End: 2024-08-20
Payer: COMMERCIAL

## 2024-08-20 VITALS
WEIGHT: 230.6 LBS | BODY MASS INDEX: 37.06 KG/M2 | HEART RATE: 72 BPM | SYSTOLIC BLOOD PRESSURE: 104 MMHG | HEIGHT: 66 IN | DIASTOLIC BLOOD PRESSURE: 68 MMHG

## 2024-08-20 DIAGNOSIS — E78.00 HYPERCHOLESTEREMIA: ICD-10-CM

## 2024-08-20 DIAGNOSIS — G47.30 SLEEP APNEA IN ADULT: ICD-10-CM

## 2024-08-20 DIAGNOSIS — R00.2 PALPITATIONS: Primary | ICD-10-CM

## 2024-08-20 DIAGNOSIS — E88.810 METABOLIC SYNDROME: ICD-10-CM

## 2024-08-20 DIAGNOSIS — I10 ESSENTIAL HYPERTENSION: ICD-10-CM

## 2024-08-20 DIAGNOSIS — I49.3 PVC (PREMATURE VENTRICULAR CONTRACTION): ICD-10-CM

## 2024-08-20 PROCEDURE — 1160F RVW MEDS BY RX/DR IN RCRD: CPT | Performed by: NURSE PRACTITIONER

## 2024-08-20 PROCEDURE — 3074F SYST BP LT 130 MM HG: CPT | Performed by: NURSE PRACTITIONER

## 2024-08-20 PROCEDURE — 3078F DIAST BP <80 MM HG: CPT | Performed by: NURSE PRACTITIONER

## 2024-08-20 PROCEDURE — 1159F MED LIST DOCD IN RCRD: CPT | Performed by: NURSE PRACTITIONER

## 2024-08-20 PROCEDURE — 99214 OFFICE O/P EST MOD 30 MIN: CPT | Performed by: NURSE PRACTITIONER

## 2024-08-20 RX ORDER — EZETIMIBE 10 MG/1
10 TABLET ORAL DAILY
Qty: 90 TABLET | Refills: 3 | Status: SHIPPED | OUTPATIENT
Start: 2024-08-20

## 2024-08-20 RX ORDER — BUDESONIDE, GLYCOPYRROLATE, AND FORMOTEROL FUMARATE 160; 9; 4.8 UG/1; UG/1; UG/1
2 AEROSOL, METERED RESPIRATORY (INHALATION) 2 TIMES DAILY
COMMUNITY

## 2024-08-20 NOTE — PROGRESS NOTES
Chief Complaint   Patient presents with    Follow-up     Cardiac management    Lab     Last labs per PCP about 3 months ago.    Chest Pain     Having pressure in right chest a couple times weekly. Unable to determine if heart related or indigestion.     Sleep apnea     Wears CPAP at night, follows with Dr Sahu.     Med Refill     PCP writes refills on medications.     Subjective       Tierney Gupta is a 49 y.o. female with hypothyroidism, mild HTN, palpitations, sleep apnea and metabolic syndrome.  She was seen in 2008 for palpitations found to have sinus tachycardia and mild PHTN.  Did not tolerate metoprolol, Corgard, diltiazem.  Later, decided to go back on nadolol. Our office was contacted in March 2021 for LE pain. CTA LE recommended but insurance denied without first having TABATHA or US. She completed this with PCP office reported as normal. DEVIN was diagnosed, wearing CPAP.        Stress test and echocardiogram repeated 1/18/2022 showing normal blood pressure response, no EKG changes.  Changes in the anteroseptal wall felt to be related to breast attenuation.  No changes in plan of care made. She was treated for RMSF. Palpitations increased. Holter monitor placed showing rare PAC/PVC.      She returns today for follow up. Palpitations well controlled with Corgard 20 mg TID. She has right sided chest pain felt to be related to indigestion. She takes PPI daily. She is compliant with CPAP. Labs followed with PCP. On 5/28/24: normal CBC, CMP, lipids increased significantly after stopping statin due to intolerance. , Tri 349, , HDL 41.  A1C 5.8%, TSH 0.878. She is also intolerance to PCSK9.        Cardiac History:    Past Surgical History:   Procedure Laterality Date    CARDIOVASCULAR STRESS TEST  08/28/2008    R. Stress- 6 Min, 30 Secs. 84% THR. Negative    CARDIOVASCULAR STRESS TEST  01/18/2022    7 Min. 30 Secs. 10.1 METS. 78% THR. BP- 157/80. EF 59%. Breast attenuation    CONVERTED (HISTORICAL)  HOLTER  07/03/2008    AVG HR 82 BPM. Max-140 BPM.    CONVERTED (HISTORICAL) HOLTER  07/10/2018    Baseline NSR with some sinus tach high 151, nadolol d/c and cardizem started    CONVERTED (HISTORICAL) HOLTER  08/02/2022    > 6 Days. Avg 79. . Rare PAC & PVC    ECHO - CONVERTED  06/30/2008    EF >60%. RVSP 33 mmHg.    ECHO - CONVERTED  06/28/2018    EF 65%. RVSP- 16 mmHg.    ECHO - CONVERTED  07/12/2018    EF 60-65%, mild MR, no AS, diastolic dysfunction, RSVP 16mmHg    ECHO - CONVERTED  01/18/2022    EF 65%. Trace-Mild MR. RVSP- 17 mmHg       Current Outpatient Medications   Medication Sig Dispense Refill    albuterol (PROVENTIL HFA;VENTOLIN HFA) 108 (90 Base) MCG/ACT inhaler Inhale 2 puffs Every 4 (Four) Hours As Needed for Wheezing.      Budeson-Glycopyrrol-Formoterol (Breztri Aerosphere) 160-9-4.8 MCG/ACT aerosol inhaler Inhale 2 puffs 2 (Two) Times a Day.      busPIRone (BUSPAR) 5 MG tablet Take 1 tablet by mouth 3 (Three) Times a Day As Needed.      Cholecalciferol (VITAMIN D3) 2000 units tablet Take 1,000 Units by mouth 2 (Two) Times a Day.      Diclofenac Sodium (VOLTAREN) 1 % gel gel Apply 4 g topically to the appropriate area as directed 2 (Two) Times a Day As Needed.      fluticasone (FLONASE) 50 MCG/ACT nasal spray 2 sprays into the nostril(s) as directed by provider Daily.      ipratropium-albuterol (DUO-NEB) 0.5-2.5 mg/3 ml nebulizer Take 3 mL by nebulization Every 4 (Four) Hours As Needed for Wheezing.      ketoconazole (NIZORAL) 2 % shampoo Apply  topically to the appropriate area as directed Daily As Needed for Dandruff.      levothyroxine (SYNTHROID, LEVOTHROID) 125 MCG tablet Take 1 tablet by mouth Daily.      montelukast (SINGULAIR) 10 MG tablet Take 1 tablet by mouth Every Night.      nadolol (CORGARD) 20 MG tablet Three times daily 270 tablet 4    omeprazole (priLOSEC) 20 MG capsule Take 1 capsule by mouth Daily.      sertraline (ZOLOFT) 100 MG tablet Take 1 tablet by mouth Daily.       sertraline (ZOLOFT) 25 MG tablet Take 1 tablet by mouth Daily. Takes with 100 mg to equal 125 mg daily      triamcinolone (KENALOG) 0.1 % cream Apply 1 Application topically to the appropriate area as directed 2 (Two) Times a Day.      ezetimibe (ZETIA) 10 MG tablet Take 1 tablet by mouth Daily. 90 tablet 3     No current facility-administered medications for this visit.       Diphenhydramine, Aspirin, Atorvastatin, Augmentin [amoxicillin-pot clavulanate], Levaquin [levofloxacin], Other, Penicillins, Zyrtec [cetirizine], Hydroxyzine, and Praluent [alirocumab]    Past Medical History:   Diagnosis Date    Dilatation of esophagus     H/O hernia repair     H/O total hysterectomy     H/O:      x2    History of carpal tunnel release     History of cholecystectomy     Hyperlipidemia     Hypothyroidism     Palpitations     [place]y [place] spotted fever 2022    Shingles 2022    Sleep apnea     uses CPAP    Tachycardia      Social History     Socioeconomic History    Marital status: Single   Tobacco Use    Smoking status: Former     Current packs/day: 0.00     Average packs/day: 1 pack/day for 20.0 years (20.0 ttl pk-yrs)     Types: Cigarettes     Start date:      Quit date:      Years since quittin.6     Passive exposure: Current    Smokeless tobacco: Never    Tobacco comments:     recently quit smoking, currently wearing patch    Vaping Use    Vaping status: Never Used   Substance and Sexual Activity    Alcohol use: Yes     Comment: occasionally    Drug use: No    Sexual activity: Defer     Family History   Problem Relation Age of Onset    Heart disease Mother     Hypertension Mother     Hyperlipidemia Mother     Heart attack Father     Stroke Brother         has PFO     Review of Systems   Constitutional: Negative for decreased appetite and malaise/fatigue.   HENT: Negative.     Eyes:  Negative for blurred vision.   Cardiovascular:  Negative for chest pain, dyspnea on exertion, leg swelling,  "palpitations and syncope.   Respiratory:  Negative for shortness of breath and sleep disturbances due to breathing.    Endocrine: Negative.    Hematologic/Lymphatic: Negative for bleeding problem. Does not bruise/bleed easily.   Skin: Negative.    Musculoskeletal:  Negative for falls and myalgias.   Gastrointestinal:  Positive for bloating and heartburn. Negative for abdominal pain and melena.   Genitourinary:  Negative for hematuria.   Neurological:  Negative for dizziness and light-headedness.   Psychiatric/Behavioral:  Negative for altered mental status.    Allergic/Immunologic: Negative.       Objective     /68 (BP Location: Left arm, Patient Position: Sitting)   Pulse 72   Ht 167 cm (65.75\")   Wt 105 kg (230 lb 9.6 oz)   BMI 37.51 kg/m²     Vitals and nursing note reviewed.   Constitutional:       General: Not in acute distress.     Appearance: Well-developed. Not diaphoretic.   Eyes:      Pupils: Pupils are equal, round, and reactive to light.   HENT:      Head: Normocephalic.   Pulmonary:      Effort: Pulmonary effort is normal. No respiratory distress.      Breath sounds: Normal breath sounds.   Cardiovascular:      Normal rate. Regular rhythm.   Pulses:     Intact distal pulses.   Edema:     Peripheral edema absent.   Abdominal:      General: Bowel sounds are normal.      Palpations: Abdomen is soft.   Musculoskeletal: Normal range of motion.      Cervical back: Normal range of motion. Skin:     General: Skin is warm and dry.   Neurological:      Mental Status: Alert and oriented to person, place, and time.        Procedures          Problem List Items Addressed This Visit          Cardiac and Vasculature    Palpitations - Primary    Hypercholesteremia    Relevant Medications    ezetimibe (ZETIA) 10 MG tablet    Essential hypertension    PVC (premature ventricular contraction)       Endocrine and Metabolic    Metabolic syndrome       Sleep    Sleep apnea in adult      Hyperlipidemia  -, Tri " 349  -statin intolerant, PCSK9 intolerant  -advised to start Zetia 10 mg daily, add fish oil daily    HTN  -well controlled with nadolol     Palpitations/PVC  -well controlled with beta blocker  -normal TSH    Atypical CP, right sided CP  -monitor symptoms, continue PPI, add TUMS  -if symptoms persist, recommend repeat cardiac work up.             Electronically signed by DANNY Mchugh,  August 23, 2024 09:09 EDT

## 2025-02-26 ENCOUNTER — OFFICE VISIT (OUTPATIENT)
Dept: CARDIOLOGY | Facility: CLINIC | Age: 50
End: 2025-02-26
Payer: COMMERCIAL

## 2025-02-26 VITALS
BODY MASS INDEX: 36.26 KG/M2 | SYSTOLIC BLOOD PRESSURE: 110 MMHG | HEART RATE: 60 BPM | DIASTOLIC BLOOD PRESSURE: 70 MMHG | WEIGHT: 225.6 LBS | HEIGHT: 66 IN

## 2025-02-26 DIAGNOSIS — R42 DIZZINESS: ICD-10-CM

## 2025-02-26 DIAGNOSIS — E78.00 HYPERCHOLESTEREMIA: Primary | ICD-10-CM

## 2025-02-26 DIAGNOSIS — R06.02 SHORTNESS OF BREATH: ICD-10-CM

## 2025-02-26 DIAGNOSIS — I20.89 OTHER FORMS OF ANGINA PECTORIS: ICD-10-CM

## 2025-02-26 DIAGNOSIS — R00.2 PALPITATIONS: ICD-10-CM

## 2025-02-26 DIAGNOSIS — I10 ESSENTIAL HYPERTENSION: ICD-10-CM

## 2025-02-26 DIAGNOSIS — I49.3 PVC (PREMATURE VENTRICULAR CONTRACTION): ICD-10-CM

## 2025-02-26 PROCEDURE — 3074F SYST BP LT 130 MM HG: CPT | Performed by: NURSE PRACTITIONER

## 2025-02-26 PROCEDURE — 99214 OFFICE O/P EST MOD 30 MIN: CPT | Performed by: NURSE PRACTITIONER

## 2025-02-26 PROCEDURE — 1160F RVW MEDS BY RX/DR IN RCRD: CPT | Performed by: NURSE PRACTITIONER

## 2025-02-26 PROCEDURE — 3078F DIAST BP <80 MM HG: CPT | Performed by: NURSE PRACTITIONER

## 2025-02-26 PROCEDURE — 1159F MED LIST DOCD IN RCRD: CPT | Performed by: NURSE PRACTITIONER

## 2025-02-26 RX ORDER — EZETIMIBE 10 MG/1
10 TABLET ORAL DAILY
Qty: 90 TABLET | Refills: 3 | Status: SHIPPED | OUTPATIENT
Start: 2025-02-26

## 2025-02-26 NOTE — PROGRESS NOTES
Chief Complaint   Patient presents with    Follow-up     Cardiac management    Lab     Last labs 1-2 months ago per PCP.    Sleep apnea     Wears CPAP at night, follows with Dr Sahu.    Chest Pain     Had been having episodes of dull pains in mid chest for the last 2-3 weeks. When episodes started she had got up to take a drink of coffee, started vomiting, was in bed for 3 days due to weakness. Reports that she does feel better today.    Blood pressure      Has been fluctuating for the last couple weeks.    Shortness of Breath     Same as before, had one episode of increased shortness of breath in the last couple of weeks.    Palpitations     Has noticed more for the last couple weeks, she feels could be related to increased anxiety.  Pharmacy had changed thyroid medication a couple weeks ago, she is getting a new brand.    Med Refill     Needs refills on zetia-90 day.     Real Gupta is a 49 y.o. female with hypothyroidism, mild HTN, palpitations, sleep apnea and metabolic syndrome.  She was seen in 2008 for palpitations found to have sinus tachycardia and mild PHTN.  Did not tolerate metoprolol, Corgard, diltiazem.  Later, decided to go back on nadolol. Our office was contacted in March 2021 for LE pain. CTA LE recommended but insurance denied without first having TABATHA or US. She completed this with PCP office reported as normal. DEVIN was diagnosed, wearing CPAP.        Stress test and echocardiogram repeated 1/18/2022 showing normal blood pressure response, no EKG changes.  Changes in the anteroseptal wall felt to be related to breast attenuation.  No changes in plan of care made. She was treated for RMSF. Palpitations increased. Holter monitor placed showing rare PAC/PVC.      She returns today for follow-up with increasing, persistent dull chest pain x 2 to 3 weeks.  Pain worsens with exertion, relieved with stress.  Pain is associated with nausea and diaphoresis.  She has dyspnea with  exertion.  Denies cough or wheeze.  Palpitations have increased she relates to anxiety and stress.  She continues Corgard.  She is statin intolerant.  She is also PCSK9 intolerant.  LDL increased to 186, triglycerides 349, 5/2024.         Cardiac History:    Past Surgical History:   Procedure Laterality Date    CARDIOVASCULAR STRESS TEST  08/28/2008    R. Stress- 6 Min, 30 Secs. 84% THR. Negative    CARDIOVASCULAR STRESS TEST  01/18/2022    7 Min. 30 Secs. 10.1 METS. 78% THR. BP- 157/80. EF 59%. Breast attenuation    CONVERTED (HISTORICAL) HOLTER  07/03/2008    AVG HR 82 BPM. Max-140 BPM.    CONVERTED (HISTORICAL) HOLTER  07/10/2018    Baseline NSR with some sinus tach high 151, nadolol d/c and cardizem started    CONVERTED (HISTORICAL) HOLTER  08/02/2022    > 6 Days. Avg 79. . Rare PAC & PVC    ECHO - CONVERTED  06/30/2008    EF >60%. RVSP 33 mmHg.    ECHO - CONVERTED  06/28/2018    EF 65%. RVSP- 16 mmHg.    ECHO - CONVERTED  07/12/2018    EF 60-65%, mild MR, no AS, diastolic dysfunction, RSVP 16mmHg    ECHO - CONVERTED  01/18/2022    EF 65%. Trace-Mild MR. RVSP- 17 mmHg     Current Outpatient Medications   Medication Sig Dispense Refill    albuterol (PROVENTIL HFA;VENTOLIN HFA) 108 (90 Base) MCG/ACT inhaler Inhale 2 puffs Every 4 (Four) Hours As Needed for Wheezing.      Budeson-Glycopyrrol-Formoterol (Breztri Aerosphere) 160-9-4.8 MCG/ACT aerosol inhaler Inhale 2 puffs 2 (Two) Times a Day.      busPIRone (BUSPAR) 5 MG tablet Take 1 tablet by mouth 3 (Three) Times a Day As Needed.      Cholecalciferol (VITAMIN D3) 2000 units tablet Take 1,000 Units by mouth 2 (Two) Times a Day.      Diclofenac Sodium (VOLTAREN) 1 % gel gel Apply 4 g topically to the appropriate area as directed 2 (Two) Times a Day As Needed.      ezetimibe (ZETIA) 10 MG tablet Take 1 tablet by mouth Daily. 90 tablet 3    fluticasone (FLONASE) 50 MCG/ACT nasal spray Administer 2 sprays into the nostril(s) as directed by provider Daily.       ipratropium-albuterol (DUO-NEB) 0.5-2.5 mg/3 ml nebulizer Take 3 mL by nebulization Every 4 (Four) Hours As Needed for Wheezing.      ketoconazole (NIZORAL) 2 % shampoo Apply  topically to the appropriate area as directed Daily As Needed for Dandruff.      levothyroxine (SYNTHROID, LEVOTHROID) 125 MCG tablet Take 1 tablet by mouth Daily.      montelukast (SINGULAIR) 10 MG tablet Take 1 tablet by mouth Every Night.      nadolol (CORGARD) 20 MG tablet Three times daily 270 tablet 4    omeprazole (priLOSEC) 20 MG capsule Take 2 capsules by mouth Daily.      sertraline (ZOLOFT) 100 MG tablet Take 1 tablet by mouth Daily.      sertraline (ZOLOFT) 25 MG tablet Take 1 tablet by mouth Daily. Takes with 100 mg to equal 125 mg daily      triamcinolone (KENALOG) 0.1 % cream Apply 1 Application topically to the appropriate area as directed 2 (Two) Times a Day.       No current facility-administered medications for this visit.       Diphenhydramine, Aspirin, Atorvastatin, Augmentin [amoxicillin-pot clavulanate], Levaquin [levofloxacin], Other, Penicillins, Zyrtec [cetirizine], Hydroxyzine, and Praluent [alirocumab]    Past Medical History:   Diagnosis Date    Dilatation of esophagus     Emphysema/COPD     H/O hernia repair     H/O total hysterectomy     H/O:      x2    History of carpal tunnel release     History of cholecystectomy     Hyperlipidemia     Hypothyroidism     Palpitations     Alexy [place] spotted fever 2022    Shingles 2022    Sleep apnea     uses CPAP    Tachycardia      Social History     Socioeconomic History    Marital status: Single   Tobacco Use    Smoking status: Former     Current packs/day: 0.00     Average packs/day: 1 pack/day for 20.0 years (20.0 ttl pk-yrs)     Types: Cigarettes     Start date:      Quit date: 2008     Years since quittin.1     Passive exposure: Current    Smokeless tobacco: Never    Tobacco comments:     recently quit smoking, currently wearing patch   "  Vaping Use    Vaping status: Never Used   Substance and Sexual Activity    Alcohol use: Yes     Comment: occasionally    Drug use: No    Sexual activity: Defer     Family History   Problem Relation Age of Onset    Heart disease Mother     Hypertension Mother     Hyperlipidemia Mother     Heart attack Father     Stroke Brother         has PFO     Review of Systems   Constitutional: Positive for malaise/fatigue and weight loss (-5). Negative for decreased appetite.   HENT: Negative.     Eyes:  Negative for blurred vision.   Cardiovascular:  Positive for chest pain, dyspnea on exertion and palpitations. Negative for leg swelling and syncope.   Respiratory:  Positive for shortness of breath and sleep disturbances due to breathing.    Endocrine: Negative.    Hematologic/Lymphatic: Negative for bleeding problem. Does not bruise/bleed easily.   Skin: Negative.    Musculoskeletal:  Negative for falls and myalgias.   Gastrointestinal:  Negative for abdominal pain, heartburn and melena.   Genitourinary:  Negative for hematuria.   Neurological:  Negative for dizziness and light-headedness.   Psychiatric/Behavioral:  Negative for altered mental status.    Allergic/Immunologic: Negative.       Objective     /70 (BP Location: Left arm, Patient Position: Sitting)   Pulse 60   Ht 167 cm (65.75\")   Wt 102 kg (225 lb 9.6 oz)   BMI 36.69 kg/m²     Vitals and nursing note reviewed.   Constitutional:       General: Not in acute distress.     Appearance: Well-developed. Not diaphoretic.   Eyes:      Pupils: Pupils are equal, round, and reactive to light.   HENT:      Head: Normocephalic.   Pulmonary:      Effort: Pulmonary effort is normal. No respiratory distress.      Breath sounds: Normal breath sounds.   Cardiovascular:      Normal rate. Regular rhythm.      Murmurs: There is a systolic murmur.   Pulses:     Intact distal pulses.   Edema:     Peripheral edema absent.   Abdominal:      General: Bowel sounds are normal.     "  Palpations: Abdomen is soft.   Musculoskeletal: Normal range of motion.      Cervical back: Normal range of motion. Skin:     General: Skin is warm and dry.   Neurological:      Mental Status: Alert and oriented to person, place, and time.        Procedures          Problem List Items Addressed This Visit          Cardiac and Vasculature    Palpitations    Relevant Orders    Stress Test With Myocardial Perfusion One Day    Adult Transthoracic Echo Complete W/ Cont if Necessary Per Protocol    Holter Monitor - 72 Hour Up To 15 Days    Hypercholesteremia - Primary    Relevant Medications    ezetimibe (ZETIA) 10 MG tablet    Other Relevant Orders    Stress Test With Myocardial Perfusion One Day    Adult Transthoracic Echo Complete W/ Cont if Necessary Per Protocol    Lipid Panel    Essential hypertension    Relevant Orders    Stress Test With Myocardial Perfusion One Day    Adult Transthoracic Echo Complete W/ Cont if Necessary Per Protocol    PVC (premature ventricular contraction)    Relevant Orders    Stress Test With Myocardial Perfusion One Day    Adult Transthoracic Echo Complete W/ Cont if Necessary Per Protocol    Holter Monitor - 72 Hour Up To 15 Days       Pulmonary and Pneumonias    Shortness of breath    Relevant Orders    Stress Test With Myocardial Perfusion One Day    Adult Transthoracic Echo Complete W/ Cont if Necessary Per Protocol     Other Visit Diagnoses       Other forms of angina pectoris        Relevant Orders    Stress Test With Myocardial Perfusion One Day    Adult Transthoracic Echo Complete W/ Cont if Necessary Per Protocol    Dizziness        Relevant Orders    Holter Monitor - 72 Hour Up To 15 Days           Increasing chest pain and dyspnea  -Nuclear stress and echocardiogram to evaluate exercise tolerance, blood pressure response, coronary artery perfusion  -Shortness of breath, echo to eval LVEF, LA, mitral regurgitation    Palpitations  -Repeat Holter monitor to evaluate  arrhythmia/PVC, rule out PAF  -Continue Corgard at this time  -TSH and mag followed by PCP    HTN  -Well-controlled with beta-blocker    Hyperlipidemia  -Lipids significantly elevated with , triglycerides 349  -Unfortunately she is intolerant to statin, PCSK9  -Continue Zetia 10 mg daily  -Encourage Mediterranean diet    Further recommendations to follow stress, echo, Holter.  Follow-up 6 months or sooner if needed.     Class 2 Severe Obesity (BMI >=35 and <=39.9). Obesity-related health conditions include the following: obstructive sleep apnea, hypertension, coronary heart disease, diabetes mellitus, impaired fasting glucose, and dyslipidemias. Obesity is improving with lifestyle modifications. BMI is is above average; BMI management plan is completed. We discussed portion control and increasing exercise.            Electronically signed by DANNY Mchugh,  March 3, 2025 08:05 EST

## 2025-03-11 ENCOUNTER — TELEPHONE (OUTPATIENT)
Dept: CARDIOLOGY | Facility: CLINIC | Age: 50
End: 2025-03-11
Payer: COMMERCIAL

## 2025-03-11 NOTE — TELEPHONE ENCOUNTER
Received call from Teri at [facility] [employer] clearance, patient's insurance is still pending for echo that is scheduled for tomorrow.     Advised okay to reschedule for authorization.

## 2025-03-12 ENCOUNTER — HOSPITAL ENCOUNTER (OUTPATIENT)
Dept: CARDIOLOGY | Facility: HOSPITAL | Age: 50
Discharge: HOME | End: 2025-03-12
Payer: COMMERCIAL

## 2025-03-12 ENCOUNTER — APPOINTMENT (OUTPATIENT)
Dept: CARDIOLOGY | Facility: HOSPITAL | Age: 50
End: 2025-03-12
Payer: COMMERCIAL

## 2025-03-12 ENCOUNTER — LAB (OUTPATIENT)
Dept: LAB | Facility: HOSPITAL | Age: 50
End: 2025-03-12
Payer: COMMERCIAL

## 2025-03-12 DIAGNOSIS — I20.89 OTHER FORMS OF ANGINA PECTORIS: ICD-10-CM

## 2025-03-12 DIAGNOSIS — R06.02 SHORTNESS OF BREATH: ICD-10-CM

## 2025-03-12 DIAGNOSIS — E78.00 HYPERCHOLESTEREMIA: ICD-10-CM

## 2025-03-12 DIAGNOSIS — I49.3 PVC (PREMATURE VENTRICULAR CONTRACTION): ICD-10-CM

## 2025-03-12 DIAGNOSIS — I10 ESSENTIAL HYPERTENSION: ICD-10-CM

## 2025-03-12 DIAGNOSIS — R00.2 PALPITATIONS: ICD-10-CM

## 2025-03-12 LAB
CHOLEST SERPL-MCNC: 266 MG/DL (ref 0–200)
HDLC SERPL-MCNC: 41 MG/DL (ref 40–60)
LDLC SERPL CALC-MCNC: 177 MG/DL (ref 0–100)
LDLC/HDLC SERPL: 4.26 {RATIO}
TRIGL SERPL-MCNC: 251 MG/DL (ref 0–150)
VLDLC SERPL-MCNC: 48 MG/DL (ref 5–40)

## 2025-03-12 PROCEDURE — 34310000005 TECHNETIUM SESTAMIBI: Performed by: INTERNAL MEDICINE

## 2025-03-12 PROCEDURE — A9500 TC99M SESTAMIBI: HCPCS | Performed by: INTERNAL MEDICINE

## 2025-03-12 PROCEDURE — 78452 HT MUSCLE IMAGE SPECT MULT: CPT

## 2025-03-12 PROCEDURE — 36415 COLL VENOUS BLD VENIPUNCTURE: CPT

## 2025-03-12 PROCEDURE — 93017 CV STRESS TEST TRACING ONLY: CPT

## 2025-03-12 PROCEDURE — 25010000002 REGADENOSON 0.4 MG/5ML SOLUTION: Performed by: NURSE PRACTITIONER

## 2025-03-12 PROCEDURE — 80061 LIPID PANEL: CPT

## 2025-03-12 RX ORDER — REGADENOSON 0.08 MG/ML
0.4 INJECTION, SOLUTION INTRAVENOUS
Status: COMPLETED | OUTPATIENT
Start: 2025-03-12 | End: 2025-03-12

## 2025-03-12 RX ADMIN — TECHNETIUM TC 99M SESTAMIBI 1 DOSE: 1 INJECTION INTRAVENOUS at 13:43

## 2025-03-12 RX ADMIN — TECHNETIUM TC 99M SESTAMIBI 1 DOSE: 1 INJECTION INTRAVENOUS at 10:24

## 2025-03-12 RX ADMIN — REGADENOSON 0.4 MG: 0.08 INJECTION, SOLUTION INTRAVENOUS at 11:14

## 2025-03-13 LAB
BH CV NUCLEAR PRIOR STUDY: 3
BH CV REST NUCLEAR ISOTOPE DOSE: 10 MCI
BH CV STRESS BP STAGE 1: NORMAL
BH CV STRESS BP STAGE 2: NORMAL
BH CV STRESS DURATION MIN STAGE 1: 3
BH CV STRESS DURATION MIN STAGE 2: 3
BH CV STRESS DURATION MIN STAGE 3: 1
BH CV STRESS DURATION SEC STAGE 1: 0
BH CV STRESS DURATION SEC STAGE 2: 0
BH CV STRESS DURATION SEC STAGE 3: 32
BH CV STRESS GRADE STAGE 1: 10
BH CV STRESS GRADE STAGE 2: 12
BH CV STRESS GRADE STAGE 3: 14
BH CV STRESS HR STAGE 1: 96
BH CV STRESS HR STAGE 2: 109
BH CV STRESS HR STAGE 3: 129
BH CV STRESS METS STAGE 1: 5
BH CV STRESS METS STAGE 2: 7.5
BH CV STRESS METS STAGE 3: 10
BH CV STRESS NUCLEAR ISOTOPE DOSE: 30 MCI
BH CV STRESS PROTOCOL 1: NORMAL
BH CV STRESS PROTOCOL 2 BP STAGE 1: NORMAL
BH CV STRESS PROTOCOL 2 COMMENTS STAGE 1: NORMAL
BH CV STRESS PROTOCOL 2 DOSE REGADENOSON STAGE 1: 0.4
BH CV STRESS PROTOCOL 2 DURATION MIN STAGE 1: 0
BH CV STRESS PROTOCOL 2 DURATION SEC STAGE 1: 10
BH CV STRESS PROTOCOL 2 HR STAGE 1: 105
BH CV STRESS PROTOCOL 2 STAGE 1: 1
BH CV STRESS PROTOCOL 2: NORMAL
BH CV STRESS RECOVERY BP: NORMAL MMHG
BH CV STRESS RECOVERY HR: 96 BPM
BH CV STRESS SPEED STAGE 1: 1.7
BH CV STRESS SPEED STAGE 2: 2.5
BH CV STRESS SPEED STAGE 3: 3.4
BH CV STRESS STAGE 1: 1
BH CV STRESS STAGE 2: 2
BH CV STRESS STAGE 3: 3
MAXIMAL PREDICTED HEART RATE: 169 BPM
PERCENT MAX PREDICTED HR: 76.33 %
SPECT HRT GATED+EF W RNC IV: 64 %
STRESS BASELINE BP: NORMAL MMHG
STRESS BASELINE HR: 83 BPM
STRESS PERCENT HR: 90 %
STRESS POST ESTIMATED WORKLOAD: 10.1 METS
STRESS POST EXERCISE DUR MIN: 7 MIN
STRESS POST EXERCISE DUR SEC: 32 SEC
STRESS POST PEAK BP: NORMAL MMHG
STRESS POST PEAK HR: 129 BPM
STRESS TARGET HR: 144 BPM

## 2025-03-18 ENCOUNTER — TELEPHONE (OUTPATIENT)
Dept: CARDIOLOGY | Facility: CLINIC | Age: 50
End: 2025-03-18
Payer: COMMERCIAL

## 2025-03-18 NOTE — TELEPHONE ENCOUNTER
No Mayur, I want you to approve it so I can get the results and provide the best care for my patient.     Cancel the echo.

## 2025-03-18 NOTE — TELEPHONE ENCOUNTER
Received call from Mayur at [facility] team. Echo has been denied, at time PA was sent on echo, needed results of myocardial perfusion. Patient had stress test after PA for echo was sent.     Mayur asking if you want to appeal?